# Patient Record
Sex: FEMALE | Race: WHITE | Employment: FULL TIME | URBAN - NONMETROPOLITAN AREA
[De-identification: names, ages, dates, MRNs, and addresses within clinical notes are randomized per-mention and may not be internally consistent; named-entity substitution may affect disease eponyms.]

---

## 2017-04-14 ENCOUNTER — HOSPITAL ENCOUNTER (OUTPATIENT)
Dept: GENERAL RADIOLOGY | Age: 37
Discharge: HOME | End: 2017-04-14
Payer: COMMERCIAL

## 2017-04-14 DIAGNOSIS — Z30.8 ENCOUNTER FOR POST ESSURE STERILIZATION CHECK: ICD-10-CM

## 2017-04-14 PROCEDURE — 74740 X-RAY FEMALE GENITAL TRACT: CPT

## 2017-09-13 ENCOUNTER — HOSPITAL ENCOUNTER (EMERGENCY)
Age: 37
Discharge: HOME | End: 2017-09-13
Payer: COMMERCIAL

## 2017-09-13 VITALS
OXYGEN SATURATION: 93 % | RESPIRATION RATE: 16 BRPM | HEART RATE: 65 BPM | SYSTOLIC BLOOD PRESSURE: 142 MMHG | TEMPERATURE: 98.3 F | DIASTOLIC BLOOD PRESSURE: 88 MMHG

## 2017-09-13 DIAGNOSIS — M79.602 PAIN OF LEFT UPPER EXTREMITY: Primary | ICD-10-CM

## 2017-09-13 DIAGNOSIS — M79.10 MYALGIA: ICD-10-CM

## 2017-09-13 PROCEDURE — 99282 EMERGENCY DEPT VISIT SF MDM: CPT

## 2017-09-13 RX ORDER — METHOCARBAMOL 500 MG/1
750 TABLET, FILM COATED ORAL 4 TIMES DAILY
Qty: 42 TABLET | Refills: 0 | Status: SHIPPED | OUTPATIENT
Start: 2017-09-13 | End: 2017-09-20

## 2017-09-13 RX ORDER — KETOROLAC TROMETHAMINE 30 MG/ML
30 INJECTION, SOLUTION INTRAMUSCULAR; INTRAVENOUS ONCE
Status: DISCONTINUED | OUTPATIENT
Start: 2017-09-13 | End: 2017-09-13 | Stop reason: HOSPADM

## 2017-09-13 ASSESSMENT — ENCOUNTER SYMPTOMS
SHORTNESS OF BREATH: 0
ABDOMINAL PAIN: 0
CONSTIPATION: 0
SORE THROAT: 0
EYE DISCHARGE: 0
EYE REDNESS: 0
BLOOD IN STOOL: 0
RHINORRHEA: 0
BACK PAIN: 0
COUGH: 0
VOMITING: 0
WHEEZING: 0
CHEST TIGHTNESS: 0
DIARRHEA: 0
NAUSEA: 0

## 2017-09-13 ASSESSMENT — PAIN SCALES - GENERAL: PAINLEVEL_OUTOF10: 8

## 2017-09-13 ASSESSMENT — PAIN DESCRIPTION - LOCATION: LOCATION: ARM

## 2017-09-13 ASSESSMENT — PAIN DESCRIPTION - DESCRIPTORS: DESCRIPTORS: TINGLING

## 2017-09-13 ASSESSMENT — PAIN DESCRIPTION - PAIN TYPE: TYPE: CHRONIC PAIN

## 2017-09-13 ASSESSMENT — PAIN DESCRIPTION - ORIENTATION: ORIENTATION: LEFT

## 2017-12-21 ENCOUNTER — HOSPITAL ENCOUNTER (EMERGENCY)
Age: 37
Discharge: HOME | End: 2017-12-21
Attending: EMERGENCY MEDICINE
Payer: COMMERCIAL

## 2017-12-21 ENCOUNTER — APPOINTMENT (OUTPATIENT)
Dept: GENERAL RADIOLOGY | Age: 37
End: 2017-12-21
Payer: COMMERCIAL

## 2017-12-21 VITALS
TEMPERATURE: 97.4 F | OXYGEN SATURATION: 96 % | RESPIRATION RATE: 16 BRPM | DIASTOLIC BLOOD PRESSURE: 59 MMHG | HEART RATE: 84 BPM | SYSTOLIC BLOOD PRESSURE: 119 MMHG

## 2017-12-21 DIAGNOSIS — J06.9 VIRAL UPPER RESPIRATORY TRACT INFECTION: Primary | ICD-10-CM

## 2017-12-21 DIAGNOSIS — J01.00 ACUTE MAXILLARY SINUSITIS, RECURRENCE NOT SPECIFIED: ICD-10-CM

## 2017-12-21 DIAGNOSIS — J11.1 INFLUENZA WITH RESPIRATORY MANIFESTATION OTHER THAN PNEUMONIA: ICD-10-CM

## 2017-12-21 LAB
DIRECT EXAM: ABNORMAL
DIRECT EXAM: NORMAL
Lab: ABNORMAL
Lab: NORMAL
Lab: NORMAL
SPECIMEN DESCRIPTION: ABNORMAL
SPECIMEN DESCRIPTION: NORMAL
STATUS: ABNORMAL
STATUS: NORMAL
STATUS: NORMAL

## 2017-12-21 PROCEDURE — 87651 STREP A DNA AMP PROBE: CPT

## 2017-12-21 PROCEDURE — 87804 INFLUENZA ASSAY W/OPTIC: CPT

## 2017-12-21 PROCEDURE — 71020 XR CHEST STANDARD TWO VW: CPT

## 2017-12-21 PROCEDURE — 99283 EMERGENCY DEPT VISIT LOW MDM: CPT

## 2017-12-21 RX ORDER — IBUPROFEN 800 MG/1
800 TABLET ORAL EVERY 6 HOURS PRN
COMMUNITY

## 2017-12-21 ASSESSMENT — PAIN SCALES - GENERAL: PAINLEVEL_OUTOF10: 8

## 2017-12-21 ASSESSMENT — ENCOUNTER SYMPTOMS
ABDOMINAL PAIN: 0
BLOOD IN STOOL: 0
TROUBLE SWALLOWING: 0
PHOTOPHOBIA: 0
NAUSEA: 0
FACIAL SWELLING: 0
SHORTNESS OF BREATH: 1
WHEEZING: 0
DIARRHEA: 0
COUGH: 1
SORE THROAT: 0
BACK PAIN: 0
VOICE CHANGE: 0
SINUS PRESSURE: 1
VOMITING: 0
CHEST TIGHTNESS: 0

## 2017-12-21 ASSESSMENT — PAIN DESCRIPTION - DESCRIPTORS: DESCRIPTORS: ACHING

## 2017-12-21 ASSESSMENT — PAIN DESCRIPTION - LOCATION: LOCATION: GENERALIZED

## 2017-12-21 NOTE — ED PROVIDER NOTES
not injected. No scleral icterus. Neck: Normal range of motion. Neck supple. No tracheal deviation present. No thyromegaly present. Cardiovascular: Normal rate, regular rhythm, normal heart sounds and intact distal pulses. Exam reveals no gallop and no friction rub. No murmur heard. Pulmonary/Chest: Effort normal and breath sounds normal. No stridor. No respiratory distress. She has no wheezes. She has no rales. Abdominal: Soft. Bowel sounds are normal. She exhibits no distension and no mass. There is no tenderness. There is no rebound and no guarding. Negative Bradley's sign  Nontender McBurney's Point  Negative Rovsig's sign  No bruising or echymosis of abdomen   Musculoskeletal: She exhibits no edema or tenderness. Negative Sanford's Sign bilaterally   Lymphadenopathy:     She has no cervical adenopathy. Neurological: She is alert and oriented to person, place, and time. No cranial nerve deficit. She exhibits normal muscle tone. Coordination normal.   No nystagmus   Skin: Skin is warm and dry. No rash noted. She is not diaphoretic. No erythema. No pallor. Psychiatric: She has a normal mood and affect. Her behavior is normal. Thought content normal.   Nursing note and vitals reviewed.       DIAGNOSTIC RESULTS     EKG: (none if blank)  All EKG's are interpreted by the Emergency Department Physician who either signs or Co-signs this chart in the absence of a cardiologist.        RADIOLOGY: (none if blank)   Interpretation per the Radiologist below, if available at the time of this note:    XR CHEST STANDARD (2 VW)    (Results Pending)       LABS:  Labs Reviewed   RAPID INFLUENZA A/B ANTIGENS - Abnormal; Notable for the following:        Result Value    Direct Exam POSITIVE for Influenza A Antigen (*)     All other components within normal limits   STREP SCREEN GROUP A THROAT   STREP A DNA PROBE, AMPLIFICATION       All other labs were within normal range or not returned as of this dictation. EMERGENCY DEPARTMENT COURSE and Medical Decision Making:     MDM/  ED Course      Well-appearing afebrile, nontoxic 47-year-old female with no evidence of bacterial illness at this time. Antibiotics are not indicated. I suggested to her use of a Tiny pot, Tylenol, Motrin, follow-up with primary care physician  Strict return precautions and follow up instructions were discussed with the patient with which the patient agrees    CONSULTS: (None if blank)  None    Procedures: (None if blank)       CLINICAL IMPRESSION      1. Viral upper respiratory tract infection    2. Acute maxillary sinusitis, recurrence not specified    3.  Influenza with respiratory manifestation other than pneumonia          DISPOSITION/PLAN   DISPOSITION        PATIENT REFERRED TO:  [place] [address]  [phone]  In 2 days        DISCHARGE MEDICATIONS:  New Prescriptions    No medications on file              (Please note that portions of this note were completed with a voice recognition program.  Efforts were made to edit the dictations but occasionally words are mis-transcribed.)      Berenice Damian DO, FACEP (electronically signed)  Attending Emergency Physician         Jacey Salcido DO  12/21/17 2828

## 2018-09-22 ENCOUNTER — HOSPITAL ENCOUNTER (EMERGENCY)
Age: 38
Discharge: HOME | End: 2018-09-22
Attending: EMERGENCY MEDICINE
Payer: COMMERCIAL

## 2018-09-22 VITALS
SYSTOLIC BLOOD PRESSURE: 130 MMHG | OXYGEN SATURATION: 98 % | BODY MASS INDEX: 23.92 KG/M2 | HEIGHT: 62 IN | RESPIRATION RATE: 18 BRPM | WEIGHT: 130 LBS | DIASTOLIC BLOOD PRESSURE: 70 MMHG | HEART RATE: 90 BPM | TEMPERATURE: 98.4 F

## 2018-09-22 DIAGNOSIS — J01.00 ACUTE MAXILLARY SINUSITIS, RECURRENCE NOT SPECIFIED: Primary | ICD-10-CM

## 2018-09-22 PROCEDURE — 99282 EMERGENCY DEPT VISIT SF MDM: CPT

## 2018-09-22 RX ORDER — AMOXICILLIN 500 MG/1
500 CAPSULE ORAL 2 TIMES DAILY
Qty: 20 CAPSULE | Refills: 0 | Status: SHIPPED | OUTPATIENT
Start: 2018-09-22 | End: 2018-10-02

## 2018-09-22 ASSESSMENT — ENCOUNTER SYMPTOMS
BLOOD IN STOOL: 0
BACK PAIN: 0
VOICE CHANGE: 0
RHINORRHEA: 1
DIARRHEA: 0
TROUBLE SWALLOWING: 0
VOMITING: 0
SHORTNESS OF BREATH: 0
WHEEZING: 0
NAUSEA: 0
COUGH: 0
PHOTOPHOBIA: 0
SINUS PRESSURE: 1
CHEST TIGHTNESS: 0
ABDOMINAL PAIN: 0
SORE THROAT: 0

## 2018-09-22 NOTE — ED PROVIDER NOTES
[facility] ED  eMERGENCY dEPARTMENT eNCOUnter      Pt Name: Concetta Scanlon  MRN: [id number]  [place] 1980  Date of evaluation: 9/22/2018  Provider: Nikunj Dill, [address] [place] [address]       Chief Complaint   Patient presents with    Nasal Congestion     onset this week and worsening       HISTORY OF PRESENT ILLNESS    Concetta Scanlon is a 42 y.o. female who presents to the emergency department from home With complaints of nasal congestion, facial pressure which worsens when she leans forward, as well as some rhinorrhea. Mild nonproductive cough. No fevers or chills. No severe headaches. No paresthesias or weakness. She has had some bilateral ear pain      Triage notes and Nursing notes were reviewed by myself. Any discrepancies are addressed above. PAST MEDICAL HISTORY     Past Medical History:   Diagnosis Date    Deaf        SURGICAL HISTORY       Past Surgical History:   Procedure Laterality Date    CHOLECYSTECTOMY  3-    TUBAL LIGATION         CURRENT MEDICATIONS       Previous Medications    ESTRADIOL CYPIONATE (DEPO-ESTRADIOL IM)    Inject 1 each into the muscle. Every 3 months.      IBUPROFEN (ADVIL;MOTRIN) 800 MG TABLET    Take 800 mg by mouth every 6 hours as needed for Pain    PSEUDOEPH-DOXYLAMINE-DM-APAP (NYQUIL PO)    Take by mouth       ALLERGIES     Pcn [penicillins]    FAMILY HISTORY       Family History   Problem Relation Age of Onset    Diabetes Maternal Grandfather     Heart Disease Maternal Grandfather         SOCIAL HISTORY       Social History     Social History    Marital status: Single     Spouse name: N/A    Number of children: N/A    Years of education: N/A     Social History Main Topics    Smoking status: Current Every Day Smoker     Packs/day: 0.50     Years: 15.00    Smokeless tobacco: Never Used    Alcohol use 3.6 oz/week     6 Cans of beer per week      Comment:  weekends occassionally    Drug use: No    Sexual activity: Not Asked Other Topics Concern    None     Social History Narrative    None       REVIEW OF SYSTEMS       Review of Systems   Constitutional: Negative for chills, diaphoresis and fever. HENT: Positive for postnasal drip, rhinorrhea and sinus pressure. Negative for sore throat, trouble swallowing and voice change. Eyes: Negative for photophobia and visual disturbance. Respiratory: Negative for cough, chest tightness, shortness of breath and wheezing. Cardiovascular: Negative for chest pain, palpitations and leg swelling. Gastrointestinal: Negative for abdominal pain, blood in stool, diarrhea, nausea and vomiting. Endocrine: Negative for polydipsia and polyuria. Genitourinary: Negative for dysuria, frequency, hematuria and urgency. Musculoskeletal: Negative for back pain, neck pain and neck stiffness. Skin: Negative for pallor and rash. Neurological: Negative for seizures, speech difficulty, weakness, numbness and headaches. Hematological: Does not bruise/bleed easily. Psychiatric/Behavioral: Negative for confusion. The patient is not nervous/anxious. Except as noted above the remainder of the review of systems was reviewed and is negative. PHYSICAL EXAM    (up to 7 for level 4, 8 or more for level 5)     ED Triage Vitals [09/22/18 0951]   BP Temp Temp Source Pulse Resp SpO2 Height Weight   (!) 134/100 98.4 °F (36.9 °C) Tympanic 101 18 98 % 5' 2\" (1.575 m) 130 lb (59 kg)       Physical Exam   Constitutional: She is oriented to person, place, and time. Vital signs are normal. She appears well-developed and well-nourished. Non-toxic appearance. She does not appear ill. No distress. HENT:   Head: Normocephalic and atraumatic. Mouth/Throat: Oropharynx is clear and moist.   Maxillary and frontal sinus tenderness   Eyes: Pupils are equal, round, and reactive to light. Conjunctivae and EOM are normal. Right conjunctiva is not injected. Left conjunctiva is not injected. No scleral icterus. Neck: Normal range of motion. Neck supple. No tracheal deviation present. No thyromegaly present. Cardiovascular: Normal rate, regular rhythm, normal heart sounds and intact distal pulses. Exam reveals no gallop and no friction rub. No murmur heard. Pulmonary/Chest: Effort normal and breath sounds normal. No stridor. No respiratory distress. She has no wheezes. She has no rales. Abdominal: Soft. Bowel sounds are normal. She exhibits no distension and no mass. There is no tenderness. There is no rebound and no guarding. Negative Bradley's sign  Nontender McBurney's Point  Negative Rovsig's sign  No bruising or echymosis of abdomen   Musculoskeletal: She exhibits no edema or tenderness. Negative Sanford's Sign bilaterally   Lymphadenopathy:     She has no cervical adenopathy. Neurological: She is alert and oriented to person, place, and time. No cranial nerve deficit. She exhibits normal muscle tone. Coordination normal.   No nystagmus   Skin: Skin is warm and dry. No rash noted. She is not diaphoretic. No erythema. No pallor. Psychiatric: She has a normal mood and affect. Her behavior is normal. Thought content normal.   Nursing note and vitals reviewed. DIAGNOSTIC RESULTS     EKG: (none if blank)  All EKG's are interpreted by the Emergency Department Physician who either signs or Co-signs this chart in the absence of a cardiologist.        RADIOLOGY: (none if blank)   Interpretation per the Radiologist below, if available at the time of this note:    No orders to display       LABS:  Labs Reviewed - No data to display    All other labs were within normal range or not returned as of this dictation. EMERGENCY DEPARTMENT COURSE and Medical Decision Making:     MDM/   Patient presents with maxillary sinusitis with no evidence of complications, no CNS extension.   Encouraged her to use Sudafed and a Lyons pot, and if no better, she started start taking an antibiotic but asked her to wait at least 7 days until she has been symptomatic for over 2 weeks as it is unlikely that the antibiotics will help her At this time. The patient agreed  Strict return precautions and follow up instructions were discussed with the patient with which the patient agrees    ED Medications administered this visit:  Medications - No data to display    CONSULTS: (None if blank)  None    Procedures: (None if blank)       CLINICAL IMPRESSION      1.  Acute maxillary sinusitis, recurrence not specified          DISPOSITION/PLAN   DISPOSITION Decision To Discharge 09/22/2018 10:13:46 AM      PATIENT REFERRED TO:  [address]  [phone]  In 3 days        DISCHARGE MEDICATIONS:  New Prescriptions    AMOXICILLIN (AMOXIL) 500 MG CAPSULE    Take 1 capsule by mouth 2 times daily for 10 days Fill if no better after 9/29/18              (Please note that portions of this note were completed with a voice recognition program.  Efforts were made to edit the dictations but occasionally words are mis-transcribed.)      Nikunj Dill DO (electronically signed)  Attending Emergency Department Provider          Debo Ruiz DO  09/22/18 [address] [place] [address]  09/22/18 [facility]

## 2018-11-16 ENCOUNTER — HOSPITAL ENCOUNTER (EMERGENCY)
Age: 38
Discharge: HOME | End: 2018-11-16
Attending: EMERGENCY MEDICINE
Payer: COMMERCIAL

## 2018-11-16 ENCOUNTER — APPOINTMENT (OUTPATIENT)
Dept: GENERAL RADIOLOGY | Age: 38
End: 2018-11-16
Payer: COMMERCIAL

## 2018-11-16 VITALS
HEART RATE: 79 BPM | WEIGHT: 130 LBS | TEMPERATURE: 97.9 F | HEIGHT: 62 IN | DIASTOLIC BLOOD PRESSURE: 82 MMHG | BODY MASS INDEX: 23.92 KG/M2 | RESPIRATION RATE: 16 BRPM | OXYGEN SATURATION: 97 % | SYSTOLIC BLOOD PRESSURE: 136 MMHG

## 2018-11-16 DIAGNOSIS — M25.511 RIGHT SHOULDER PAIN, UNSPECIFIED CHRONICITY: Primary | ICD-10-CM

## 2018-11-16 LAB
ABSOLUTE EOS #: 0.23 K/UL (ref 0–0.44)
ABSOLUTE IMMATURE GRANULOCYTE: 0.03 K/UL (ref 0–0.3)
ABSOLUTE LYMPH #: 3.01 K/UL (ref 1.1–3.7)
ABSOLUTE MONO #: 0.72 K/UL (ref 0.1–1.2)
ALBUMIN SERPL-MCNC: 4.2 G/DL (ref 3.5–5.2)
ALBUMIN/GLOBULIN RATIO: 1.5 (ref 1–2.5)
ALP BLD-CCNC: 71 U/L (ref 35–104)
ALT SERPL-CCNC: 12 U/L (ref 5–33)
AMYLASE: 52 U/L (ref 28–100)
ANION GAP SERPL CALCULATED.3IONS-SCNC: 14 MMOL/L (ref 9–17)
ANION GAP SERPL CALCULATED.3IONS-SCNC: 14 MMOL/L (ref 9–17)
AST SERPL-CCNC: 12 U/L
BASOPHILS # BLD: 1 % (ref 0–2)
BASOPHILS ABSOLUTE: 0.05 K/UL (ref 0–0.2)
BILIRUB SERPL-MCNC: 0.18 MG/DL (ref 0.3–1.2)
BNP INTERPRETATION: NORMAL
BUN BLDV-MCNC: 16 MG/DL (ref 6–20)
BUN BLDV-MCNC: 16 MG/DL (ref 6–20)
BUN/CREAT BLD: 24 (ref 9–20)
BUN/CREAT BLD: 24 (ref 9–20)
CALCIUM SERPL-MCNC: 8.8 MG/DL (ref 8.6–10.4)
CALCIUM SERPL-MCNC: 8.9 MG/DL (ref 8.6–10.4)
CHLORIDE BLD-SCNC: 100 MMOL/L (ref 98–107)
CHLORIDE BLD-SCNC: 102 MMOL/L (ref 98–107)
CO2: 21 MMOL/L (ref 20–31)
CO2: 21 MMOL/L (ref 20–31)
CREAT SERPL-MCNC: 0.66 MG/DL (ref 0.5–0.9)
CREAT SERPL-MCNC: 0.68 MG/DL (ref 0.5–0.9)
D-DIMER QUANTITATIVE: <0.19 MG/L FEU (ref 0.19–0.5)
DIFFERENTIAL TYPE: NORMAL
EKG ATRIAL RATE: 96 BPM
EKG P AXIS: 50 DEGREES
EKG P-R INTERVAL: 148 MS
EKG Q-T INTERVAL: 358 MS
EKG QRS DURATION: 62 MS
EKG QTC CALCULATION (BAZETT): 452 MS
EKG R AXIS: 10 DEGREES
EKG T AXIS: 39 DEGREES
EKG VENTRICULAR RATE: 96 BPM
EOSINOPHILS RELATIVE PERCENT: 2 % (ref 1–4)
GFR AFRICAN AMERICAN: >60 ML/MIN
GFR AFRICAN AMERICAN: >60 ML/MIN
GFR NON-AFRICAN AMERICAN: >60 ML/MIN
GFR NON-AFRICAN AMERICAN: >60 ML/MIN
GFR SERPL CREATININE-BSD FRML MDRD: ABNORMAL ML/MIN/{1.73_M2}
GLUCOSE BLD-MCNC: 92 MG/DL (ref 70–99)
GLUCOSE BLD-MCNC: 93 MG/DL (ref 70–99)
HCT VFR BLD CALC: 42.3 % (ref 36.3–47.1)
HEMOGLOBIN: 14.4 G/DL (ref 11.9–15.1)
IMMATURE GRANULOCYTES: 0 %
INR BLD: 0.9 (ref 0.9–1.2)
LIPASE: 23 U/L (ref 13–60)
LYMPHOCYTES # BLD: 31 % (ref 24–43)
MAGNESIUM: 2.1 MG/DL (ref 1.6–2.6)
MCH RBC QN AUTO: 31.9 PG (ref 25.2–33.5)
MCHC RBC AUTO-ENTMCNC: 34 G/DL (ref 28.4–34.8)
MCV RBC AUTO: 93.8 FL (ref 82.6–102.9)
MONOCYTES # BLD: 7 % (ref 3–12)
NRBC AUTOMATED: 0 PER 100 WBC
PARTIAL THROMBOPLASTIN TIME: 26.3 SEC (ref 23.2–34.4)
PDW BLD-RTO: 12.3 % (ref 11.8–14.4)
PLATELET # BLD: 246 K/UL (ref 138–453)
PLATELET ESTIMATE: NORMAL
PMV BLD AUTO: 9.4 FL (ref 8.1–13.5)
POTASSIUM SERPL-SCNC: 3.5 MMOL/L (ref 3.7–5.3)
POTASSIUM SERPL-SCNC: 3.6 MMOL/L (ref 3.7–5.3)
PRO-BNP: 66 PG/ML
PROTHROMBIN TIME: 9.5 SEC (ref 9.7–12.2)
RBC # BLD: 4.51 M/UL (ref 3.95–5.11)
RBC # BLD: NORMAL 10*6/UL
SEG NEUTROPHILS: 59 % (ref 36–65)
SEGMENTED NEUTROPHILS ABSOLUTE COUNT: 5.76 K/UL (ref 1.5–8.1)
SODIUM BLD-SCNC: 135 MMOL/L (ref 135–144)
SODIUM BLD-SCNC: 137 MMOL/L (ref 135–144)
TOTAL PROTEIN: 7 G/DL (ref 6.4–8.3)
TROPONIN INTERP: NORMAL
TROPONIN T: <0.03 NG/ML
WBC # BLD: 9.8 K/UL (ref 3.5–11.3)
WBC # BLD: NORMAL 10*3/UL

## 2018-11-16 PROCEDURE — 83735 ASSAY OF MAGNESIUM: CPT

## 2018-11-16 PROCEDURE — 85610 PROTHROMBIN TIME: CPT

## 2018-11-16 PROCEDURE — 84484 ASSAY OF TROPONIN QUANT: CPT

## 2018-11-16 PROCEDURE — 83880 ASSAY OF NATRIURETIC PEPTIDE: CPT

## 2018-11-16 PROCEDURE — 93005 ELECTROCARDIOGRAM TRACING: CPT

## 2018-11-16 PROCEDURE — 80048 BASIC METABOLIC PNL TOTAL CA: CPT

## 2018-11-16 PROCEDURE — 80053 COMPREHEN METABOLIC PANEL: CPT

## 2018-11-16 PROCEDURE — 85730 THROMBOPLASTIN TIME PARTIAL: CPT

## 2018-11-16 PROCEDURE — 99284 EMERGENCY DEPT VISIT MOD MDM: CPT

## 2018-11-16 PROCEDURE — 36415 COLL VENOUS BLD VENIPUNCTURE: CPT

## 2018-11-16 PROCEDURE — 96374 THER/PROPH/DIAG INJ IV PUSH: CPT

## 2018-11-16 PROCEDURE — 82150 ASSAY OF AMYLASE: CPT

## 2018-11-16 PROCEDURE — 6370000000 HC RX 637 (ALT 250 FOR IP): Performed by: EMERGENCY MEDICINE

## 2018-11-16 PROCEDURE — 6360000002 HC RX W HCPCS: Performed by: EMERGENCY MEDICINE

## 2018-11-16 PROCEDURE — 71045 X-RAY EXAM CHEST 1 VIEW: CPT

## 2018-11-16 PROCEDURE — 85025 COMPLETE CBC W/AUTO DIFF WBC: CPT

## 2018-11-16 PROCEDURE — 85379 FIBRIN DEGRADATION QUANT: CPT

## 2018-11-16 PROCEDURE — 83690 ASSAY OF LIPASE: CPT

## 2018-11-16 RX ORDER — OXYCODONE HYDROCHLORIDE AND ACETAMINOPHEN 5; 325 MG/1; MG/1
1 TABLET ORAL ONCE
Status: COMPLETED | OUTPATIENT
Start: 2018-11-16 | End: 2018-11-16

## 2018-11-16 RX ORDER — KETOROLAC TROMETHAMINE 15 MG/ML
15 INJECTION, SOLUTION INTRAMUSCULAR; INTRAVENOUS ONCE
Status: COMPLETED | OUTPATIENT
Start: 2018-11-16 | End: 2018-11-16

## 2018-11-16 RX ADMIN — OXYCODONE HYDROCHLORIDE AND ACETAMINOPHEN 1 TABLET: 5; 325 TABLET ORAL at 07:48

## 2018-11-16 RX ADMIN — KETOROLAC TROMETHAMINE 15 MG: 15 INJECTION, SOLUTION INTRAMUSCULAR; INTRAVENOUS at 06:50

## 2018-11-16 ASSESSMENT — PAIN DESCRIPTION - LOCATION: LOCATION: CHEST;SHOULDER;BACK

## 2018-11-16 ASSESSMENT — PAIN DESCRIPTION - PAIN TYPE
TYPE: ACUTE PAIN
TYPE: ACUTE PAIN

## 2018-11-16 ASSESSMENT — PAIN SCALES - GENERAL
PAINLEVEL_OUTOF10: 10

## 2018-11-16 NOTE — ED PROVIDER NOTES
QUANTITATIVE - Abnormal; Notable for the following:     D-Dimer, Quant <0.19 (*)     All other components within normal limits   APTT   BRAIN NATRIURETIC PEPTIDE   CBC WITH AUTO DIFFERENTIAL   TROPONIN   LIPASE   AMYLASE   MAGNESIUM             Summation      Patient Course: Patient is reevaluated and she appears comfortable and her pains improving. She is advised to follow up with PCP to get referred for pain management further management of her ongoing pain. ED Medications administered this visit:    Medications   oxyCODONE-acetaminophen (PERCOCET) 5-325 MG per tablet 1 tablet (not administered)   ketorolac (TORADOL) injection 15 mg (15 mg Intravenous Given 11/16/18 0650)       New Prescriptions from thisvisit:    Current Discharge Medication List          Follow-up:  [place] [address]  [phone]  In 2 days          DISPOSITION Decision To Discharge 11/16/2018 07:42:46 AM      Final Impression:   1.  Right shoulder pain, unspecified chronicity               (Please note that portions of this note were completed with a voice recognition program.  Efforts were made to edit the dictations but occasionally words aremis-transcribed.)          Celio Jon MD  11/16/18 1175

## 2020-02-20 ENCOUNTER — HOSPITAL ENCOUNTER (OUTPATIENT)
Age: 40
Setting detail: OBSERVATION
Discharge: HOME | End: 2020-02-21
Attending: EMERGENCY MEDICINE | Admitting: INTERNAL MEDICINE
Payer: COMMERCIAL

## 2020-02-20 LAB
ABSOLUTE EOS #: 0.13 K/UL (ref 0–0.44)
ABSOLUTE IMMATURE GRANULOCYTE: 0.07 K/UL (ref 0–0.3)
ABSOLUTE LYMPH #: 2.36 K/UL (ref 1.1–3.7)
ABSOLUTE MONO #: 1.12 K/UL (ref 0.1–1.2)
BASOPHILS # BLD: 0 % (ref 0–2)
BASOPHILS ABSOLUTE: 0.05 K/UL (ref 0–0.2)
DIFFERENTIAL TYPE: ABNORMAL
EOSINOPHILS RELATIVE PERCENT: 1 % (ref 1–4)
HCT VFR BLD CALC: 46.6 % (ref 36.3–47.1)
HEMOGLOBIN: 15.5 G/DL (ref 11.9–15.1)
IMMATURE GRANULOCYTES: 0 %
LYMPHOCYTES # BLD: 14 % (ref 24–43)
MCH RBC QN AUTO: 31 PG (ref 25.2–33.5)
MCHC RBC AUTO-ENTMCNC: 33.3 G/DL (ref 28.4–34.8)
MCV RBC AUTO: 93.2 FL (ref 82.6–102.9)
MONOCYTES # BLD: 6 % (ref 3–12)
NRBC AUTOMATED: 0 PER 100 WBC
PDW BLD-RTO: 12.3 % (ref 11.8–14.4)
PLATELET # BLD: 224 K/UL (ref 138–453)
PLATELET ESTIMATE: ABNORMAL
PMV BLD AUTO: 10.1 FL (ref 8.1–13.5)
RBC # BLD: 5 M/UL (ref 3.95–5.11)
RBC # BLD: ABNORMAL 10*6/UL
SEG NEUTROPHILS: 79 % (ref 36–65)
SEGMENTED NEUTROPHILS ABSOLUTE COUNT: 13.76 K/UL (ref 1.5–8.1)
WBC # BLD: 17.5 K/UL (ref 3.5–11.3)
WBC # BLD: ABNORMAL 10*3/UL

## 2020-02-20 PROCEDURE — 84703 CHORIONIC GONADOTROPIN ASSAY: CPT

## 2020-02-20 PROCEDURE — 96374 THER/PROPH/DIAG INJ IV PUSH: CPT

## 2020-02-20 PROCEDURE — 36415 COLL VENOUS BLD VENIPUNCTURE: CPT

## 2020-02-20 PROCEDURE — 2580000003 HC RX 258: Performed by: EMERGENCY MEDICINE

## 2020-02-20 PROCEDURE — 85730 THROMBOPLASTIN TIME PARTIAL: CPT

## 2020-02-20 PROCEDURE — 83690 ASSAY OF LIPASE: CPT

## 2020-02-20 PROCEDURE — 80053 COMPREHEN METABOLIC PANEL: CPT

## 2020-02-20 PROCEDURE — 87086 URINE CULTURE/COLONY COUNT: CPT

## 2020-02-20 PROCEDURE — 85610 PROTHROMBIN TIME: CPT

## 2020-02-20 PROCEDURE — 96375 TX/PRO/DX INJ NEW DRUG ADDON: CPT

## 2020-02-20 PROCEDURE — 6360000002 HC RX W HCPCS: Performed by: EMERGENCY MEDICINE

## 2020-02-20 PROCEDURE — 81001 URINALYSIS AUTO W/SCOPE: CPT

## 2020-02-20 PROCEDURE — 99285 EMERGENCY DEPT VISIT HI MDM: CPT

## 2020-02-20 PROCEDURE — 85025 COMPLETE CBC W/AUTO DIFF WBC: CPT

## 2020-02-20 RX ORDER — KETOROLAC TROMETHAMINE 15 MG/ML
15 INJECTION, SOLUTION INTRAMUSCULAR; INTRAVENOUS ONCE
Status: COMPLETED | OUTPATIENT
Start: 2020-02-20 | End: 2020-02-20

## 2020-02-20 RX ORDER — ONDANSETRON 2 MG/ML
4 INJECTION INTRAMUSCULAR; INTRAVENOUS ONCE
Status: COMPLETED | OUTPATIENT
Start: 2020-02-20 | End: 2020-02-20

## 2020-02-20 RX ORDER — 0.9 % SODIUM CHLORIDE 0.9 %
1000 INTRAVENOUS SOLUTION INTRAVENOUS ONCE
Status: COMPLETED | OUTPATIENT
Start: 2020-02-20 | End: 2020-02-21

## 2020-02-20 RX ADMIN — KETOROLAC TROMETHAMINE 15 MG: 15 INJECTION, SOLUTION INTRAMUSCULAR; INTRAVENOUS at 23:59

## 2020-02-20 RX ADMIN — ONDANSETRON 4 MG: 2 INJECTION INTRAMUSCULAR; INTRAVENOUS at 23:58

## 2020-02-20 RX ADMIN — SODIUM CHLORIDE 1000 ML: 9 INJECTION, SOLUTION INTRAVENOUS at 23:58

## 2020-02-20 ASSESSMENT — PAIN SCALES - GENERAL
PAINLEVEL_OUTOF10: 9
PAINLEVEL_OUTOF10: 10

## 2020-02-20 ASSESSMENT — PAIN DESCRIPTION - LOCATION: LOCATION: ABDOMEN

## 2020-02-20 ASSESSMENT — PAIN DESCRIPTION - ORIENTATION: ORIENTATION: RIGHT

## 2020-02-20 ASSESSMENT — PAIN DESCRIPTION - DESCRIPTORS: DESCRIPTORS: CRAMPING;SHARP

## 2020-02-20 ASSESSMENT — PAIN DESCRIPTION - PAIN TYPE: TYPE: ACUTE PAIN

## 2020-02-21 ENCOUNTER — APPOINTMENT (OUTPATIENT)
Dept: CT IMAGING | Age: 40
End: 2020-02-21
Payer: COMMERCIAL

## 2020-02-21 ENCOUNTER — ANESTHESIA EVENT (OUTPATIENT)
Dept: OPERATING ROOM | Age: 40
End: 2020-02-21
Payer: COMMERCIAL

## 2020-02-21 ENCOUNTER — ANESTHESIA (OUTPATIENT)
Dept: OPERATING ROOM | Age: 40
End: 2020-02-21
Payer: COMMERCIAL

## 2020-02-21 ENCOUNTER — APPOINTMENT (OUTPATIENT)
Dept: GENERAL RADIOLOGY | Age: 40
End: 2020-02-21
Payer: COMMERCIAL

## 2020-02-21 VITALS
DIASTOLIC BLOOD PRESSURE: 76 MMHG | SYSTOLIC BLOOD PRESSURE: 156 MMHG | OXYGEN SATURATION: 96 % | RESPIRATION RATE: 21 BRPM

## 2020-02-21 VITALS
OXYGEN SATURATION: 97 % | SYSTOLIC BLOOD PRESSURE: 129 MMHG | WEIGHT: 132 LBS | TEMPERATURE: 97.6 F | RESPIRATION RATE: 18 BRPM | HEIGHT: 62 IN | HEART RATE: 90 BPM | BODY MASS INDEX: 24.29 KG/M2 | DIASTOLIC BLOOD PRESSURE: 72 MMHG

## 2020-02-21 PROBLEM — N20.0 RENAL CALCULUS, RIGHT: Status: ACTIVE | Noted: 2020-02-21

## 2020-02-21 PROBLEM — N39.0 UTI (URINARY TRACT INFECTION): Status: ACTIVE | Noted: 2020-02-21

## 2020-02-21 LAB
-: ABNORMAL
ALBUMIN SERPL-MCNC: 4.1 G/DL (ref 3.5–5.2)
ALBUMIN/GLOBULIN RATIO: 1.3 (ref 1–2.5)
ALP BLD-CCNC: 77 U/L (ref 35–104)
ALT SERPL-CCNC: 11 U/L (ref 5–33)
AMORPHOUS: ABNORMAL
ANION GAP SERPL CALCULATED.3IONS-SCNC: 15 MMOL/L (ref 9–17)
AST SERPL-CCNC: 16 U/L
BACTERIA: ABNORMAL
BILIRUB SERPL-MCNC: 0.28 MG/DL (ref 0.3–1.2)
BILIRUBIN URINE: NEGATIVE
BUN BLDV-MCNC: 13 MG/DL (ref 6–20)
BUN/CREAT BLD: 11 (ref 9–20)
CALCIUM SERPL-MCNC: 9.2 MG/DL (ref 8.6–10.4)
CASTS UA: ABNORMAL /LPF
CHLORIDE BLD-SCNC: 99 MMOL/L (ref 98–107)
CO2: 23 MMOL/L (ref 20–31)
COLOR: YELLOW
COMMENT UA: ABNORMAL
CREAT SERPL-MCNC: 1.14 MG/DL (ref 0.5–0.9)
CRYSTALS, UA: ABNORMAL /HPF
EPITHELIAL CELLS UA: ABNORMAL /HPF (ref 0–25)
GFR AFRICAN AMERICAN: >60 ML/MIN
GFR NON-AFRICAN AMERICAN: 53 ML/MIN
GFR SERPL CREATININE-BSD FRML MDRD: ABNORMAL ML/MIN/{1.73_M2}
GFR SERPL CREATININE-BSD FRML MDRD: ABNORMAL ML/MIN/{1.73_M2}
GLUCOSE BLD-MCNC: 105 MG/DL (ref 70–99)
GLUCOSE URINE: NEGATIVE
HCG QUALITATIVE: NEGATIVE
INR BLD: 0.9 (ref 0.9–1.2)
KETONES, URINE: NEGATIVE
LACTIC ACID, SEPSIS WHOLE BLOOD: NORMAL MMOL/L (ref 0.5–1.9)
LACTIC ACID, SEPSIS WHOLE BLOOD: NORMAL MMOL/L (ref 0.5–1.9)
LACTIC ACID, SEPSIS: 1 MMOL/L (ref 0.5–1.9)
LACTIC ACID, SEPSIS: 1.3 MMOL/L (ref 0.5–1.9)
LEUKOCYTE ESTERASE, URINE: ABNORMAL
LIPASE: 22 U/L (ref 13–60)
MUCUS: ABNORMAL
NITRITE, URINE: NEGATIVE
OTHER OBSERVATIONS UA: ABNORMAL
PARTIAL THROMBOPLASTIN TIME: 27.1 SEC (ref 23.2–34.4)
PH UA: 6 (ref 5–9)
POTASSIUM SERPL-SCNC: 3.8 MMOL/L (ref 3.7–5.3)
PROTEIN UA: ABNORMAL
PROTHROMBIN TIME: 9.6 SEC (ref 9.7–12.2)
RBC UA: ABNORMAL /HPF (ref 0–2)
RENAL EPITHELIAL, UA: ABNORMAL /HPF
SODIUM BLD-SCNC: 137 MMOL/L (ref 135–144)
SPECIFIC GRAVITY UA: 1.03 (ref 1.01–1.02)
TOTAL PROTEIN: 7.3 G/DL (ref 6.4–8.3)
TRICHOMONAS: ABNORMAL
TURBIDITY: ABNORMAL
URINE HGB: ABNORMAL
UROBILINOGEN, URINE: NORMAL
WBC UA: ABNORMAL /HPF (ref 0–5)
YEAST: ABNORMAL

## 2020-02-21 PROCEDURE — 2580000003 HC RX 258: Performed by: UROLOGY

## 2020-02-21 PROCEDURE — G0378 HOSPITAL OBSERVATION PER HR: HCPCS

## 2020-02-21 PROCEDURE — 3600000012 HC SURGERY LEVEL 2 ADDTL 15MIN: Performed by: UROLOGY

## 2020-02-21 PROCEDURE — 3700000000 HC ANESTHESIA ATTENDED CARE: Performed by: UROLOGY

## 2020-02-21 PROCEDURE — 7100000001 HC PACU RECOVERY - ADDTL 15 MIN: Performed by: UROLOGY

## 2020-02-21 PROCEDURE — 6370000000 HC RX 637 (ALT 250 FOR IP): Performed by: UROLOGY

## 2020-02-21 PROCEDURE — 6360000002 HC RX W HCPCS: Performed by: UROLOGY

## 2020-02-21 PROCEDURE — 2709999900 HC NON-CHARGEABLE SUPPLY: Performed by: UROLOGY

## 2020-02-21 PROCEDURE — 87040 BLOOD CULTURE FOR BACTERIA: CPT

## 2020-02-21 PROCEDURE — 3700000001 HC ADD 15 MINUTES (ANESTHESIA): Performed by: UROLOGY

## 2020-02-21 PROCEDURE — 2580000003 HC RX 258: Performed by: INTERNAL MEDICINE

## 2020-02-21 PROCEDURE — 2500000003 HC RX 250 WO HCPCS: Performed by: NURSE ANESTHETIST, CERTIFIED REGISTERED

## 2020-02-21 PROCEDURE — 2580000003 HC RX 258: Performed by: EMERGENCY MEDICINE

## 2020-02-21 PROCEDURE — 74176 CT ABD & PELVIS W/O CONTRAST: CPT

## 2020-02-21 PROCEDURE — 6370000000 HC RX 637 (ALT 250 FOR IP): Performed by: EMERGENCY MEDICINE

## 2020-02-21 PROCEDURE — 83605 ASSAY OF LACTIC ACID: CPT

## 2020-02-21 PROCEDURE — 36415 COLL VENOUS BLD VENIPUNCTURE: CPT

## 2020-02-21 PROCEDURE — 6360000002 HC RX W HCPCS: Performed by: NURSE ANESTHETIST, CERTIFIED REGISTERED

## 2020-02-21 PROCEDURE — 74018 RADEX ABDOMEN 1 VIEW: CPT

## 2020-02-21 PROCEDURE — C1769 GUIDE WIRE: HCPCS | Performed by: UROLOGY

## 2020-02-21 PROCEDURE — 7100000000 HC PACU RECOVERY - FIRST 15 MIN: Performed by: UROLOGY

## 2020-02-21 PROCEDURE — 96376 TX/PRO/DX INJ SAME DRUG ADON: CPT

## 2020-02-21 PROCEDURE — 3600000002 HC SURGERY LEVEL 2 BASE: Performed by: UROLOGY

## 2020-02-21 PROCEDURE — 6360000002 HC RX W HCPCS: Performed by: EMERGENCY MEDICINE

## 2020-02-21 PROCEDURE — 96375 TX/PRO/DX INJ NEW DRUG ADDON: CPT

## 2020-02-21 PROCEDURE — C2617 STENT, NON-COR, TEM W/O DEL: HCPCS | Performed by: UROLOGY

## 2020-02-21 DEVICE — URETERAL STENT
Type: IMPLANTABLE DEVICE | Status: FUNCTIONAL
Brand: PERCUFLEX™ PLUS

## 2020-02-21 RX ORDER — AZITHROMYCIN 250 MG/1
1000 TABLET, FILM COATED ORAL ONCE
Status: COMPLETED | OUTPATIENT
Start: 2020-02-21 | End: 2020-02-21

## 2020-02-21 RX ORDER — METRONIDAZOLE 250 MG/1
2000 TABLET ORAL ONCE
Status: COMPLETED | OUTPATIENT
Start: 2020-02-21 | End: 2020-02-21

## 2020-02-21 RX ORDER — MIDAZOLAM HYDROCHLORIDE 1 MG/ML
INJECTION INTRAMUSCULAR; INTRAVENOUS PRN
Status: DISCONTINUED | OUTPATIENT
Start: 2020-02-21 | End: 2020-02-21 | Stop reason: SDUPTHER

## 2020-02-21 RX ORDER — LIDOCAINE HYDROCHLORIDE 20 MG/ML
INJECTION, SOLUTION EPIDURAL; INFILTRATION; INTRACAUDAL; PERINEURAL PRN
Status: DISCONTINUED | OUTPATIENT
Start: 2020-02-21 | End: 2020-02-21 | Stop reason: SDUPTHER

## 2020-02-21 RX ORDER — KETOROLAC TROMETHAMINE 15 MG/ML
10 INJECTION, SOLUTION INTRAMUSCULAR; INTRAVENOUS 3 TIMES DAILY PRN
Status: DISCONTINUED | OUTPATIENT
Start: 2020-02-21 | End: 2020-02-21

## 2020-02-21 RX ORDER — ACETAMINOPHEN 10 MG/ML
INJECTION, SOLUTION INTRAVENOUS PRN
Status: DISCONTINUED | OUTPATIENT
Start: 2020-02-21 | End: 2020-02-21 | Stop reason: SDUPTHER

## 2020-02-21 RX ORDER — SODIUM CHLORIDE 9 MG/ML
INJECTION, SOLUTION INTRAVENOUS CONTINUOUS
Status: DISCONTINUED | OUTPATIENT
Start: 2020-02-21 | End: 2020-02-21

## 2020-02-21 RX ORDER — ONDANSETRON 2 MG/ML
INJECTION INTRAMUSCULAR; INTRAVENOUS PRN
Status: DISCONTINUED | OUTPATIENT
Start: 2020-02-21 | End: 2020-02-21 | Stop reason: SDUPTHER

## 2020-02-21 RX ORDER — ONDANSETRON 2 MG/ML
4 INJECTION INTRAMUSCULAR; INTRAVENOUS ONCE
Status: COMPLETED | OUTPATIENT
Start: 2020-02-21 | End: 2020-02-21

## 2020-02-21 RX ORDER — SODIUM CHLORIDE 9 MG/ML
INJECTION, SOLUTION INTRAVENOUS CONTINUOUS
Status: DISCONTINUED | OUTPATIENT
Start: 2020-02-21 | End: 2020-02-21 | Stop reason: HOSPADM

## 2020-02-21 RX ORDER — FENTANYL CITRATE 50 UG/ML
50 INJECTION, SOLUTION INTRAMUSCULAR; INTRAVENOUS ONCE
Status: COMPLETED | OUTPATIENT
Start: 2020-02-21 | End: 2020-02-21

## 2020-02-21 RX ORDER — DEXAMETHASONE SODIUM PHOSPHATE 4 MG/ML
INJECTION, SOLUTION INTRA-ARTICULAR; INTRALESIONAL; INTRAMUSCULAR; INTRAVENOUS; SOFT TISSUE PRN
Status: DISCONTINUED | OUTPATIENT
Start: 2020-02-21 | End: 2020-02-21 | Stop reason: SDUPTHER

## 2020-02-21 RX ORDER — CEFAZOLIN SODIUM 2 G/50ML
2 SOLUTION INTRAVENOUS ONCE
Status: DISCONTINUED | OUTPATIENT
Start: 2020-02-21 | End: 2020-02-21

## 2020-02-21 RX ORDER — 0.9 % SODIUM CHLORIDE 0.9 %
1000 INTRAVENOUS SOLUTION INTRAVENOUS ONCE
Status: COMPLETED | OUTPATIENT
Start: 2020-02-21 | End: 2020-02-21

## 2020-02-21 RX ORDER — FENTANYL CITRATE 50 UG/ML
50 INJECTION, SOLUTION INTRAMUSCULAR; INTRAVENOUS EVERY 5 MIN PRN
Status: DISCONTINUED | OUTPATIENT
Start: 2020-02-21 | End: 2020-02-21 | Stop reason: HOSPADM

## 2020-02-21 RX ORDER — SULFAMETHOXAZOLE AND TRIMETHOPRIM 800; 160 MG/1; MG/1
1 TABLET ORAL 2 TIMES DAILY
Qty: 20 TABLET | Refills: 0 | Status: SHIPPED | OUTPATIENT
Start: 2020-02-21 | End: 2020-03-02

## 2020-02-21 RX ORDER — KETOROLAC TROMETHAMINE 15 MG/ML
10 INJECTION, SOLUTION INTRAMUSCULAR; INTRAVENOUS 3 TIMES DAILY PRN
Status: DISCONTINUED | OUTPATIENT
Start: 2020-02-21 | End: 2020-02-21 | Stop reason: HOSPADM

## 2020-02-21 RX ORDER — ONDANSETRON 2 MG/ML
4 INJECTION INTRAMUSCULAR; INTRAVENOUS
Status: DISCONTINUED | OUTPATIENT
Start: 2020-02-21 | End: 2020-02-21 | Stop reason: HOSPADM

## 2020-02-21 RX ORDER — PROPOFOL 10 MG/ML
INJECTION, EMULSION INTRAVENOUS PRN
Status: DISCONTINUED | OUTPATIENT
Start: 2020-02-21 | End: 2020-02-21 | Stop reason: SDUPTHER

## 2020-02-21 RX ORDER — LIDOCAINE HYDROCHLORIDE 20 MG/ML
JELLY TOPICAL PRN
Status: DISCONTINUED | OUTPATIENT
Start: 2020-02-21 | End: 2020-02-21 | Stop reason: HOSPADM

## 2020-02-21 RX ORDER — FLUCONAZOLE 150 MG/1
150 TABLET ORAL ONCE
Status: COMPLETED | OUTPATIENT
Start: 2020-02-21 | End: 2020-02-21

## 2020-02-21 RX ADMIN — FLUCONAZOLE 150 MG: 150 TABLET ORAL at 02:26

## 2020-02-21 RX ADMIN — DEXTROSE MONOHYDRATE 2 G: 50 INJECTION, SOLUTION INTRAVENOUS at 07:37

## 2020-02-21 RX ADMIN — ONDANSETRON 4 MG: 2 INJECTION INTRAMUSCULAR; INTRAVENOUS at 00:50

## 2020-02-21 RX ADMIN — SODIUM CHLORIDE: 9 INJECTION, SOLUTION INTRAVENOUS at 04:42

## 2020-02-21 RX ADMIN — LIDOCAINE HYDROCHLORIDE 60 MG: 20 INJECTION, SOLUTION EPIDURAL; INFILTRATION; INTRACAUDAL at 07:45

## 2020-02-21 RX ADMIN — SODIUM CHLORIDE 1000 ML: 9 INJECTION, SOLUTION INTRAVENOUS at 02:23

## 2020-02-21 RX ADMIN — PROPOFOL 140 MG: 10 INJECTION, EMULSION INTRAVENOUS at 07:45

## 2020-02-21 RX ADMIN — DEXAMETHASONE SODIUM PHOSPHATE 4 MG: 4 INJECTION, SOLUTION INTRAMUSCULAR; INTRAVENOUS at 07:51

## 2020-02-21 RX ADMIN — ACETAMINOPHEN 1000 MG: 10 INJECTION, SOLUTION INTRAVENOUS at 07:50

## 2020-02-21 RX ADMIN — ONDANSETRON 4 MG: 2 INJECTION INTRAMUSCULAR; INTRAVENOUS at 07:51

## 2020-02-21 RX ADMIN — AZITHROMYCIN MONOHYDRATE 1000 MG: 250 TABLET ORAL at 02:23

## 2020-02-21 RX ADMIN — CEFTRIAXONE 1 G: 1 INJECTION, POWDER, FOR SOLUTION INTRAMUSCULAR; INTRAVENOUS at 02:24

## 2020-02-21 RX ADMIN — MIDAZOLAM 2 MG: 1 INJECTION INTRAMUSCULAR; INTRAVENOUS at 07:43

## 2020-02-21 RX ADMIN — SODIUM CHLORIDE: 9 INJECTION, SOLUTION INTRAVENOUS at 08:50

## 2020-02-21 RX ADMIN — FENTANYL CITRATE 50 MCG: 50 INJECTION INTRAMUSCULAR; INTRAVENOUS at 00:51

## 2020-02-21 RX ADMIN — METRONIDAZOLE 2000 MG: 250 TABLET ORAL at 02:23

## 2020-02-21 ASSESSMENT — ENCOUNTER SYMPTOMS
BACK PAIN: 0
BLOOD IN STOOL: 0
COLOR CHANGE: 0
SHORTNESS OF BREATH: 0
RHINORRHEA: 0
NAUSEA: 1
DIARRHEA: 0
VOMITING: 1
ABDOMINAL PAIN: 1
WHEEZING: 0

## 2020-02-21 ASSESSMENT — PULMONARY FUNCTION TESTS
PIF_VALUE: 0
PIF_VALUE: 3
PIF_VALUE: 4
PIF_VALUE: 1
PIF_VALUE: 0
PIF_VALUE: 3
PIF_VALUE: 4
PIF_VALUE: 2
PIF_VALUE: 3
PIF_VALUE: 2
PIF_VALUE: 4
PIF_VALUE: 1
PIF_VALUE: 1
PIF_VALUE: 0
PIF_VALUE: 3
PIF_VALUE: 18
PIF_VALUE: 3
PIF_VALUE: 3

## 2020-02-21 ASSESSMENT — PAIN - FUNCTIONAL ASSESSMENT: PAIN_FUNCTIONAL_ASSESSMENT: 0-10

## 2020-02-21 ASSESSMENT — PAIN SCALES - GENERAL
PAINLEVEL_OUTOF10: 0

## 2020-02-21 ASSESSMENT — LIFESTYLE VARIABLES: SMOKING_STATUS: 1

## 2020-02-21 NOTE — ANESTHESIA POSTPROCEDURE EVALUATION
Department of Anesthesiology  Postprocedure Note    Patient: Stonedelfino Clamp  MRN: [id number]  YOB: 1980  Date of evaluation: 2/21/2020  Time:  9:18 AM     Procedure Summary     Date:  02/21/20 Room / Location:  [facility]    Anesthesia Start:  6318 Anesthesia Stop:  0804    Procedure:  CYSTOSCOPY  STENT INSERTION (Right ) Diagnosis:  (PYELONEPHRITIS)    Surgeon:  Julian Arechiga MD Responsible Provider:  PEPE Salazar CRNA    Anesthesia Type:  general ASA Status:  2          Anesthesia Type: general    Peri Phase I: Peri Score: 10    Peri Phase II:      Last vitals: Reviewed and per EMR flowsheets.        Anesthesia Post Evaluation    Patient location during evaluation: PACU  Patient participation: complete - patient participated  Level of consciousness: awake and alert  Airway patency: patent  Nausea & Vomiting: no nausea and no vomiting  Complications: no  Cardiovascular status: blood pressure returned to baseline and hemodynamically stable  Respiratory status: acceptable and room air  Hydration status: euvolemic

## 2020-02-21 NOTE — H&P
History and Physical    Patient:  Sarah Mohamud  MRN: [id number]    Chief Complaint:  Right flank pain    History Obtained From:  patient, electronic medical record    PCP: PEPE Rogers CNP    History of Present Illness: The patient is a 37 y.o. female who presents with Right Flank Pain. Patient did present to the emergency department last night. Patient had right flank pain. This is been ongoing. Patient has seen a chiropractor for this pain. Patient did have a CT scan performed. This film was independently reviewed. This does show a 3 mm stone in the right distal ureter. Patient also had elevated white blood cell count of 17,500. Patient's creatinine was slightly elevated but overall normal.  Patient has had no fevers. She reports no history of stones. Pain is controlled with IV pain medication. Patient currently is resting in bed. She just returned from a Cystoscopy with right renal stent placement. She denies pain at this time. Denies n/v. Tolerating diet and activity well. Denies hematuria but does have frequency. I explained with the stent, that will be a normal feeling. Past Medical History:        Diagnosis Date    Deaf        Past Surgical History:        Procedure Laterality Date    CHOLECYSTECTOMY  3-    CYSTOSCOPY  02/21/2020    with stent placement    TUBAL LIGATION         Family History:       Problem Relation Age of Onset    Diabetes Maternal Grandfather     Heart Disease Maternal Grandfather        Social History:   TOBACCO:   reports that she has been smoking. She has a 7.50 pack-year smoking history. She has never used smokeless tobacco.  ETOH:   reports current alcohol use of about 6.0 standard drinks of alcohol per week.   ELICIT DRUG USE:    Social History     Substance and Sexual Activity   Drug Use No     OCCUPATION: Unknown      Allergies:  Pcn [penicillins]    Medications Prior to Admission:    Prior to Admission medications    Medication Sig Start Date End Date Taking? Authorizing Provider   ibuprofen (ADVIL;MOTRIN) 800 MG tablet Take 800 mg by mouth every 6 hours as needed for Pain   Yes Historical Provider, MD   Pseudoeph-Doxylamine-DM-APAP (NYQUIL PO) Take by mouth    Historical Provider, MD   Estradiol Cypionate (DEPO-ESTRADIOL IM) Inject 1 each into the muscle. Every 3 months. Historical Provider, MD       Review of Systems:  Constitutional:negative  for fevers, and negative for chills. Eyes: negative for visual disturbance   ENT: negative for sore throat, negative nasal congestion, and negative for earache  Respiratory: negative for shortness of breath, negative for cough, and negative for wheezing  Cardiovascular: negative for chest pain, negative for palpitations, and negative for syncope  Gastrointestinal: positive for abdominal pain, positive for nausea,negative for vomiting, negative for diarrhea, negative for constipation, and negative for hematochezia or melena  Genitourinary: negative for dysuria, negative for urinary urgency, positive for urinary frequency, and negative for hematuria  Skin: negative for skin rash, and negative for skin lesions  Neurological: negative for unilateral weakness, numbness or tingling. Physical Exam:    Vitals:   Temp: 97.6 °F (36.4 °C)  BP: 137/87  Resp: 20  Pulse: 86  SpO2: 97 %  24HR INTAKE/OUTPUT:      Intake/Output Summary (Last 24 hours) at 2/21/2020 1108  Last data filed at 2/21/2020 0827  Gross per 24 hour   Intake 2200 ml   Output --   Net 2200 ml       Exam:  GEN:  alert and oriented to person, place and time, well-developed and well-nourished, in no acute distress  EYES: No gross abnormalities. , PERRL and EOMI  NECK: normal, supple, no lymphadenopathy,  no carotid bruits  PULM: clear to auscultation bilaterally- no wheezes, rales or rhonchi, normal air movement, no respiratory distress  COR: regular rate & rhythm, no murmurs, no gallops, S1 normal and S2 normal  ABD:  soft, non-tender, infection) 02/21/2020    Hearing impaired person 03/26/2012       Plan:     · This patient requires overnight observation because of Right Flank Pain, UTI,   · Factors affecting the medical complexity of this patient include Renal Calculi  · Estimated length of stay is 1 days  · Appreciate Urology Consult--UTI/Right Renal Calculi  · IV Fluids--UTI/Right Renal Calculi  · IV ATBs--UTI  · Pain control--Right Renal Calculi  · High risk medication monitoring: None    CORE MEASURES  DVT prophylaxis: Lovenox  Decubitus ulcer present on admission: No  CODE STATUS: FULL CODE  Nutrition Status: good   Physical therapy: NA   Old Charts reviewed: Yes  EKG Reviewed: No  Advance Directive Addressed: No    PEPE Keenan, NP-C  2/21/2020, 11:08 AM

## 2020-02-21 NOTE — CONSULTS
Urology Consultation    Patient:  Nancy Engle  MRN: [id number]  YOB: 1980    CHIEF COMPLAINT:  Right Flank Pain    HISTORY OF PRESENT ILLNESS:   The patient is a 37 y.o. female who presents with Right Flank Pain. Patient did present to the emergency department last night. Patient had right flank pain. This is been ongoing. Patient has seen a chiropractor for this pain. Patient did have a CT scan performed. This film was independently reviewed. This does show a 3 mm stone in the right distal ureter. Patient also had elevated white blood cell count of 17,500. Patient's creatinine was slightly elevated but overall normal.  Patient has had no fevers. She reports no history of stones. Pain is controlled with IV pain medication. Patient's old records, notes and chart reviewed and summarized above. Past Medical History:    Past Medical History:   Diagnosis Date    Deaf        Past Surgical History:    Past Surgical History:   Procedure Laterality Date    CHOLECYSTECTOMY  3-    TUBAL LIGATION       Previous  surgery: none   Medications:    Scheduled Meds:   ceFAZolin  2 g Intravenous Once     Continuous Infusions:   [MAR Hold] sodium chloride 125 mL/hr at 02/21/20 0442     PRN Meds:. [MAR Hold] ketorolac    Allergies:  Pcn [penicillins]    Social History:    Social History     Socioeconomic History    Marital status: Single     Spouse name: Not on file    Number of children: Not on file    Years of education: Not on file    Highest education level: Not on file   Occupational History    Not on file   Social Needs    Financial resource strain: Not on file    Food insecurity:     Worry: Not on file     Inability: Not on file    Transportation needs:     Medical: Not on file     Non-medical: Not on file   Tobacco Use    Smoking status: Current Every Day Smoker     Packs/day: 0.50     Years: 15.00     Pack years: 7.50    Smokeless tobacco: Never Used   Substance and

## 2020-02-21 NOTE — ED PROVIDER NOTES
None   Lactate, Sepsis   Result Value Ref Range    Lactic Acid, Sepsis 1.0 0.5 - 1.9 mmol/L    Lactic Acid, Sepsis, Whole Blood NOT REPORTED 0.5 - 1.9 mmol/L   Protime-INR   Result Value Ref Range    Protime 9.6 (L) 9.7 - 12.2 sec    INR 0.9 0.9 - 1.2   APTT   Result Value Ref Range    PTT 27.1 23.2 - 34.4 sec       IMPRESSION: 47-year-old female comes to the emergency department secondary to right-sided flank pain radiating to the right upper and the right lower quadrant of the abdomen. Some dysuria, leukocytosis, right-sided CVA on examination. CT abdomen pelvis showed a obstructive uropathy. Pyuria, moderate leukocytes, leukocytosis with a white blood count of 17.5, this increases concern for pyelonephritis and a superimposed urinary infection on top of a obstructing stone. Given the combination of the stone and obstruction and an infection, I believe this patient will be requiring a stent placement therefore an admission is recommended. Patient was started on full septic work-up and given IV Rocephin. Patient was also positive for trichomonas, was given a one-time dose of 2 g of Flagyl for the Trichomonas. And after discussion, prophylactic treatment for chlamydia was done with 1 g of azithromycin. Otherwise Rocephin will cover for any gonorrhea infections as well given that patient has 1 STD, she is at risk for others. RADIOLOGY:    Ct Abdomen Pelvis Wo Contrast    Result Date: 2/21/2020  EXAMINATION: CT OF THE ABDOMEN AND PELVIS WITHOUT CONTRAST 2/21/2020 12:15 am TECHNIQUE: CT of the abdomen and pelvis was performed without the administration of intravenous contrast. Multiplanar reformatted images are provided for review. Dose modulation, iterative reconstruction, and/or weight based adjustment of the mA/kV was utilized to reduce the radiation dose to as low as reasonably achievable. COMPARISON: None. HISTORY: ORDERING SYSTEM PROVIDED HISTORY: R flank pain radiating to the RUQ and RLQ. TECHNOLOGIST PROVIDED HISTORY: R flank pain radiating to the RUQ and RLQ. Is the patient pregnant?->No FINDINGS: Lower Chest: The lung bases are clear and the heart size is normal. Organs: The gallbladder is surgically absent. Bile ducts are not dilated. The liver, spleen, pancreas, adrenal glands and left kidney are normal.  The right kidney is enlarged and there is perinephric fat stranding. There is mild right hydronephrosis and hydroureter extending to an obstructing 2-3 mm calculus at the right UVJ (axial image 160, coronal image 56). No other evidence of urolithiasis. GI/Bowel: The appendix is normal.  Unopacified bowel loops are unremarkable. No bowel obstruction. Pelvis: There are bilateral Essure tubal occlusion devices. Uterus is mildly enlarged. Mild follicular prominence of both ovaries. Small amount of free low-attenuation fluid in the cul-de-sac. Peritoneum/Retroperitoneum: There is no adenopathy or free air. Bones/Soft Tissues: No acute bone or soft tissue abnormality. Acute right obstructive uropathy secondary to a 2-3 mm calculus at the right UVJ. Small amount of free fluid in the cul-de-sac, likely physiologic. Bilateral Essure tubal occlusion devices. Uterus is mildly enlarged.        EKG  None    All EKG's are interpreted by the Emergency Department Physician who either signs or Co-signs this chart in the absence of a cardiologist.    EMERGENCY DEPARTMENT COURSE:    Sepsis Times and Checklist  Vital Signs: BP: (!) 155/88  Pulse: 90  Resp: 14  Temp: 97.6 °F (36.4 °C) SpO2: 98 %  SIRS (>2)   Temp > 38.3C or < 36C   HR > 90   RR > 20   WBC > 12 or < 4 or >10% bands  SIRS (>2) and confirmed or suspected source of infection = Sepsis    Sepsis Identified   Date: 2/21/20  Time: 12:10 am  Sepsis Orders:   CBC: Yes   CMP: Yes   PT/PTT: Yes   Blood Cultures x2: Yes   Urinalysis and Urine Culture: Yes   Lactate: Yes   Broad Spectrum Antibiotics Given (within 3 hours of sepsis identification,

## 2020-02-21 NOTE — PROGRESS NOTES
Patient is a 37year old female here with UTI . She is alert and oriented. Patient is single and lives at home with her 12year old daughter. She uses no medical equipment. Patient does her own cooking and cleaning. She is independent with her ADL's. Patient manages her medication and her family provides the transportation. Her PCP is Abigail Duckworth CNP. She has medical insurance that helps with medication costs. The discharge plan is home with no services. She does not have advance directives. LSW to monitor and assist with any needs or concerns as they arise.     GURWINDER Bhagat

## 2020-02-21 NOTE — H&P (VIEW-ONLY)
non-distended, normal bowel sounds, no masses or organomegaly  EXT:   no cyanosis, clubbing or edema present    NEURO: follows commands, FRYE, no deficits  SKIN:  no rashes or significant lesions  -----------------------------------------------------------------  Diagnostic Data:   Lab Results   Component Value Date    WBC 17.5 (H) 02/20/2020    HGB 15.5 (H) 02/20/2020     02/20/2020       Lab Results   Component Value Date    BUN 13 02/20/2020    CREATININE 1.14 (H) 02/20/2020     02/20/2020    K 3.8 02/20/2020    CALCIUM 9.2 02/20/2020    CL 99 02/20/2020    CO2 23 02/20/2020    LABGLOM 53 (L) 02/20/2020       Lab Results   Component Value Date    WBCUA 5 TO 10 02/20/2020    RBCUA 2 TO 5 02/20/2020    EPITHUA 2 TO 5 02/20/2020    LEUKOCYTESUR MODERATE (A) 02/20/2020    SPECGRAV 1.030 (H) 02/20/2020    GLUCOSEU NEGATIVE 02/20/2020    KETUA NEGATIVE 02/20/2020    PROTEINU TRACE (A) 02/20/2020    HGBUR 3+ (A) 02/20/2020    CASTUA NOT REPORTED 02/20/2020    CRYSTUA 2 TO 5 CALCIUM OXALATE (A) 02/20/2020    BACTERIA 2+ (A) 02/20/2020    YEAST PRESENCE NOTED (A) 02/20/2020       Lab Results   Component Value Date    TROPONINT <0.03 11/16/2018    PROBNP 66 11/16/2018       Ct Abdomen Pelvis Wo Contrast    Result Date: 2/21/2020  EXAMINATION: CT OF THE ABDOMEN AND PELVIS WITHOUT CONTRAST 2/21/2020 12:15 am TECHNIQUE: CT of the abdomen and pelvis was performed without the administration of intravenous contrast. Multiplanar reformatted images are provided for review. Dose modulation, iterative reconstruction, and/or weight based adjustment of the mA/kV was utilized to reduce the radiation dose to as low as reasonably achievable. COMPARISON: None. HISTORY: ORDERING SYSTEM PROVIDED HISTORY: R flank pain radiating to the RUQ and RLQ. TECHNOLOGIST PROVIDED HISTORY: R flank pain radiating to the RUQ and RLQ.  Is the patient pregnant?->No FINDINGS: Lower Chest: The lung bases are clear and the heart size is normal. Organs: The gallbladder is surgically absent. Bile ducts are not dilated. The liver, spleen, pancreas, adrenal glands and left kidney are normal.  The right kidney is enlarged and there is perinephric fat stranding. There is mild right hydronephrosis and hydroureter extending to an obstructing 2-3 mm calculus at the right UVJ (axial image 160, coronal image 56). No other evidence of urolithiasis. GI/Bowel: The appendix is normal.  Unopacified bowel loops are unremarkable. No bowel obstruction. Pelvis: There are bilateral Essure tubal occlusion devices. Uterus is mildly enlarged. Mild follicular prominence of both ovaries. Small amount of free low-attenuation fluid in the cul-de-sac. Peritoneum/Retroperitoneum: There is no adenopathy or free air. Bones/Soft Tissues: No acute bone or soft tissue abnormality. Acute right obstructive uropathy secondary to a 2-3 mm calculus at the right UVJ. Small amount of free fluid in the cul-de-sac, likely physiologic. Bilateral Essure tubal occlusion devices. Uterus is mildly enlarged. Xr Abdomen (kub) (single Ap View)    Result Date: 2/21/2020  EXAMINATION: SPOT FLUOROSCOPIC IMAGES 2/21/2020 7:58 am TECHNIQUE: Fluoroscopy was provided by the radiology department for procedure. Radiologist was not present during examination. FLUOROSCOPY DOSE AND TYPE OR TIME AND EXPOSURES: 9 spot images were obtained. Total fluoroscopy time was 1 second. Fluoroscopy dose was 2.7 mGy. COMPARISON: CT on 02/21/2020 HISTORY: Intraprocedural imaging. FINDINGS: Cystoscopy and right ureteroscopy is performed. A right ureteral stent is placed. Bilateral Essure tubal occlusion coils noted. Right ureteral stent placement. See separate procedure report for more information. Assessment:    Active Problems:    UTI (urinary tract infection)  Resolved Problems:    * No resolved hospital problems.  *      Patient Active Problem List    Diagnosis Date Noted    UTI (urinary tract

## 2020-02-21 NOTE — ANESTHESIA PRE PROCEDURE
Department of Anesthesiology  Preprocedure Note       Name:  eTagan Hein   Age:  37 y.o.  :  1980                                          MRN:  [id number]         Date:  2020      Surgeon: Drew Diaz):  Raymond Crouch MD    Procedure: Masoud Fend INSERTION (Right )    Medications prior to admission:   Prior to Admission medications    Medication Sig Start Date End Date Taking? Authorizing Provider   ibuprofen (ADVIL;MOTRIN) 800 MG tablet Take 800 mg by mouth every 6 hours as needed for Pain   Yes Historical Provider, MD   Pseudoeph-Doxylamine-DM-APAP (NYQUIL PO) Take by mouth    Historical Provider, MD   Estradiol Cypionate (DEPO-ESTRADIOL IM) Inject 1 each into the muscle. Every 3 months. Historical Provider, MD       Current medications:    Current Facility-Administered Medications   Medication Dose Route Frequency Provider Last Rate Last Dose    [MAR Hold] ketorolac (TORADOL) injection 10 mg  10 mg Intravenous TID PRN Sriram Asif MD        [facility] Hold] 0.9 % sodium chloride infusion   Intravenous Continuous Sriram Asif  mL/hr at 20 1224         Allergies: Allergies   Allergen Reactions    Pcn [Penicillins] Nausea And Vomiting       Problem List:    Patient Active Problem List   Diagnosis Code    Hearing impaired person H91.90    UTI (urinary tract infection) N39.0       Past Medical History:        Diagnosis Date    Deaf        Past Surgical History:        Procedure Laterality Date    CHOLECYSTECTOMY  3-    TUBAL LIGATION         Social History:    Social History     Tobacco Use    Smoking status: Current Every Day Smoker     Packs/day: 0.50     Years: 15.00     Pack years: 7.50    Smokeless tobacco: Never Used   Substance Use Topics    Alcohol use:  Yes     Alcohol/week: 6.0 standard drinks     Types: 6 Cans of beer per week     Comment:  weekends occassionally                                Ready to quit: Not Answered  Counseling given: Not Answered      Vital Signs (Current):   Vitals:    02/21/20 0338 02/21/20 0500 02/21/20 0648 02/21/20 0711   BP: 137/68 111/74 108/60 129/87   Pulse: 88 69 83 57   Resp: 16 16 16 16   Temp: 37 °C (98.6 °F) 36.6 °C (97.8 °F) 36.4 °C (97.6 °F) 36.2 °C (97.1 °F)   TempSrc: Temporal Temporal Temporal Temporal   SpO2:  97% 98% 98%   Weight: 132 lb 8 oz (60.1 kg)   132 lb (59.9 kg)   Height:    5' 2\" (1.575 m)                                              BP Readings from Last 3 Encounters:   02/21/20 129/87   11/16/18 136/82   09/22/18 130/70       NPO Status: Time of last liquid consumption: 1800                        Time of last solid consumption: 1800                        Date of last liquid consumption: 02/20/20                        Date of last solid food consumption: 02/20/20    BMI:   Wt Readings from Last 3 Encounters:   02/21/20 132 lb (59.9 kg)   11/16/18 130 lb (59 kg)   09/22/18 130 lb (59 kg)     Body mass index is 24.14 kg/m². CBC:   Lab Results   Component Value Date    WBC 17.5 02/20/2020    RBC 5.00 02/20/2020    HGB 15.5 02/20/2020    HCT 46.6 02/20/2020    MCV 93.2 02/20/2020    RDW 12.3 02/20/2020     02/20/2020       CMP:   Lab Results   Component Value Date     02/20/2020    K 3.8 02/20/2020    CL 99 02/20/2020    CO2 23 02/20/2020    BUN 13 02/20/2020    CREATININE 1.14 02/20/2020    GFRAA >60 02/20/2020    LABGLOM 53 02/20/2020    GLUCOSE 105 02/20/2020    PROT 7.3 02/20/2020    CALCIUM 9.2 02/20/2020    BILITOT 0.28 02/20/2020    ALKPHOS 77 02/20/2020    AST 16 02/20/2020    ALT 11 02/20/2020       POC Tests: No results for input(s): POCGLU, POCNA, POCK, POCCL, POCBUN, POCHEMO, POCHCT in the last 72 hours.     Coags:   Lab Results   Component Value Date    PROTIME 9.6 02/20/2020    INR 0.9 02/20/2020    APTT 27.1 02/20/2020       HCG (If Applicable):   Lab Results   Component Value Date    PREGTESTUR NEGATIVE 12/05/2012        ABGs: No results found for: PHART, PO2ART, MDX6IYL, IUP3EJW, BEART, P6SREBKC     Type & Screen (If Applicable):  No results found for: [facility], [address]    Anesthesia Evaluation  Patient summary reviewed and Nursing notes reviewed no history of anesthetic complications:   Airway: Mallampati: II  TM distance: >3 FB   Neck ROM: full  Mouth opening: > = 3 FB Dental:          Pulmonary:normal exam    (+) current smoker          Patient did not smoke on day of surgery. Cardiovascular:  Exercise tolerance: good (>4 METS),         ECG reviewed                        Neuro/Psych:   Negative Neuro/Psych ROS              GI/Hepatic/Renal:        (-) GERD       Endo/Other: Negative Endo/Other ROS                    Abdominal:           Vascular: negative vascular ROS. Anesthesia Plan      general     ASA 2       Induction: intravenous. MIPS: Postoperative opioids intended and Prophylactic antiemetics administered.                       PEPE Contreras - CRNA   2/21/2020

## 2020-02-21 NOTE — DISCHARGE SUMMARY
Discharge Summary    Claribel Johns  :  1980  MRN:  [id number]    Admit date:  2020      Discharge date: 2020     Admitting Physician:  Joseph Pierce MD    Consultants:  Dr. Nguyen Bowles, urology    Procedures: Cystoscopy with right ureteral stent placement    Complications: none    Discharge Condition: stable    Hospital Course:   Claribel Johns is a 37 y.o. female admitted with Right Flank Pain.  Patient did present to the emergency department last night. Comfort Sullivan had right flank pain.  This is been ongoing. Comfort Sullivan has seen a chiropractor for this pain. Comfort Sullivan did have a CT scan performed.  This film was independently reviewed.  This does show a 3 mm stone in the right distal ureter.  Patient also had elevated white blood cell count of 17,500.  Patient's creatinine was slightly elevated but overall normal.  Patient has had no fevers.  She reports no history of stones. Pain is controlled with IV pain medication.  Patient currently is resting in bed. She just returned from a Cystoscopy with right renal stent placement. She denies pain at this time. Denies n/v. Tolerating diet and activity well. Denies hematuria but does have frequency. I explained with the stent, that will be a normal feeling. I will place her on 10 days of Bactrim for her UTI. She will be scheduled by Urology office for a Stone Therapy. .  Exam:  GEN:  alert and oriented to person, place and time, well-developed and well-nourished, in no acute distress  EYES: No gross abnormalities. , PERRL and EOMI  NECK: normal, supple, no lymphadenopathy,  no carotid bruits  PULM: clear to auscultation bilaterally- no wheezes, rales or rhonchi, normal air movement, no respiratory distress  COR: regular rate & rhythm, no murmurs, no gallops, S1 normal and S2 normal  ABD:  soft, non-tender, non-distended, normal bowel sounds, no masses or organomegaly  EXT:   no cyanosis, clubbing or edema present    NEURO: follows commands, FRYE, no deficits  SKIN:  no rashes or significant lesions    Significant Diagnostic Studies:   Lab Results   Component Value Date    WBC 17.5 (H) 02/20/2020    HGB 15.5 (H) 02/20/2020     02/20/2020       Lab Results   Component Value Date    BUN 13 02/20/2020    CREATININE 1.14 (H) 02/20/2020     02/20/2020    K 3.8 02/20/2020    CALCIUM 9.2 02/20/2020    CL 99 02/20/2020    CO2 23 02/20/2020    LABGLOM 53 (L) 02/20/2020       Lab Results   Component Value Date    WBCUA 5 TO 10 02/20/2020    RBCUA 2 TO 5 02/20/2020    EPITHUA 2 TO 5 02/20/2020    LEUKOCYTESUR MODERATE (A) 02/20/2020    SPECGRAV 1.030 (H) 02/20/2020    GLUCOSEU NEGATIVE 02/20/2020    KETUA NEGATIVE 02/20/2020    PROTEINU TRACE (A) 02/20/2020    HGBUR 3+ (A) 02/20/2020    CASTUA NOT REPORTED 02/20/2020    CRYSTUA 2 TO 5 CALCIUM OXALATE (A) 02/20/2020    BACTERIA 2+ (A) 02/20/2020    YEAST PRESENCE NOTED (A) 02/20/2020       Ct Abdomen Pelvis Wo Contrast    Result Date: 2/21/2020  EXAMINATION: CT OF THE ABDOMEN AND PELVIS WITHOUT CONTRAST 2/21/2020 12:15 am TECHNIQUE: CT of the abdomen and pelvis was performed without the administration of intravenous contrast. Multiplanar reformatted images are provided for review. Dose modulation, iterative reconstruction, and/or weight based adjustment of the mA/kV was utilized to reduce the radiation dose to as low as reasonably achievable. COMPARISON: None. HISTORY: ORDERING SYSTEM PROVIDED HISTORY: R flank pain radiating to the RUQ and RLQ. TECHNOLOGIST PROVIDED HISTORY: R flank pain radiating to the RUQ and RLQ. Is the patient pregnant?->No FINDINGS: Lower Chest: The lung bases are clear and the heart size is normal. Organs: The gallbladder is surgically absent. Bile ducts are not dilated. The liver, spleen, pancreas, adrenal glands and left kidney are normal.  The right kidney is enlarged and there is perinephric fat stranding.   There is mild right hydronephrosis and hydroureter extending to an obstructing 2-3 mm calculus at the right UVJ (axial image 160, coronal image 56). No other evidence of urolithiasis. GI/Bowel: The appendix is normal.  Unopacified bowel loops are unremarkable. No bowel obstruction. Pelvis: There are bilateral Essure tubal occlusion devices. Uterus is mildly enlarged. Mild follicular prominence of both ovaries. Small amount of free low-attenuation fluid in the cul-de-sac. Peritoneum/Retroperitoneum: There is no adenopathy or free air. Bones/Soft Tissues: No acute bone or soft tissue abnormality. Acute right obstructive uropathy secondary to a 2-3 mm calculus at the right UVJ. Small amount of free fluid in the cul-de-sac, likely physiologic. Bilateral Essure tubal occlusion devices. Uterus is mildly enlarged. Xr Abdomen (kub) (single Ap View)    Result Date: 2/21/2020  EXAMINATION: SPOT FLUOROSCOPIC IMAGES 2/21/2020 7:58 am TECHNIQUE: Fluoroscopy was provided by the radiology department for procedure. Radiologist was not present during examination. FLUOROSCOPY DOSE AND TYPE OR TIME AND EXPOSURES: 9 spot images were obtained. Total fluoroscopy time was 1 second. Fluoroscopy dose was 2.7 mGy. COMPARISON: CT on 02/21/2020 HISTORY: Intraprocedural imaging. FINDINGS: Cystoscopy and right ureteroscopy is performed. A right ureteral stent is placed. Bilateral Essure tubal occlusion coils noted. Right ureteral stent placement. See separate procedure report for more information. Discharge Diagnoses:    Principal Problem:    UTI (urinary tract infection)  Active Problems:    Renal calculus, right  Resolved Problems:    * No resolved hospital problems.  *      Active Hospital Problems    Diagnosis Date Noted    Renal calculus, right [N20.0] 02/21/2020     Priority: High     Class: Acute    UTI (urinary tract infection) [N39.0] 02/21/2020       Discharge Medications:       Allen Swift   Home Medication Instructions EAS:[id number]    Printed on:02/21/20 3426 Medication Information                      Estradiol Cypionate (DEPO-ESTRADIOL IM)  Inject 1 each into the muscle. Every 3 months. ibuprofen (ADVIL;MOTRIN) 800 MG tablet  Take 800 mg by mouth every 6 hours as needed for Pain             Pseudoeph-Doxylamine-DM-APAP (NYQUIL PO)  Take by mouth             sulfamethoxazole-trimethoprim (BACTRIM DS;SEPTRA DS) 800-160 MG per tablet  Take 1 tablet by mouth 2 times daily for 10 days                 Patient Instructions: Activity: activity as tolerated  Diet: encourage fluids  Wound Care: none needed  Other: Leave ureteral stent in place. Stone therapy as an outpt.       Disposition:   Discharge to Home    Follow up:  Patient will be followed by PEPE Marie CNP in 1-2 weeks    CORE MEASURES on Discharge (if applicable)  ACE/ARB in CHF: NA  Statin in MI: NA  ASA in MI: NA  Statin in CVA: NA  Antiplatelet in CVA: NA    Total time spent on discharge services: 45 minutes    Including the following activities:  Evaluation and Management of patient  Discussion with patient and/or surrogate about current care plan  Coordination with Case Management and/or   Coordination of care with Consultants (if applicable)   Coordination of care with Receiving Facility Physician (if applicable)  Completion of DME forms (if applicable)  Preparation of Discharge Summary  Preparation of Medication Reconciliation  Preparation of Discharge Prescriptions    Signed:  PEPE Fuller, NP-C  2/21/2020, 11:39 AM

## 2020-02-22 LAB
CULTURE: NORMAL
Lab: NORMAL
SPECIMEN DESCRIPTION: NORMAL

## 2020-02-26 LAB
CULTURE: NORMAL
CULTURE: NORMAL
Lab: NORMAL
Lab: NORMAL
SPECIMEN DESCRIPTION: NORMAL
SPECIMEN DESCRIPTION: NORMAL

## 2020-03-01 PROBLEM — N20.1 URETERAL CALCULUS: Status: ACTIVE | Noted: 2020-03-01

## 2020-03-02 ENCOUNTER — ANESTHESIA EVENT (OUTPATIENT)
Dept: OPERATING ROOM | Age: 40
End: 2020-03-02
Payer: COMMERCIAL

## 2020-03-03 ENCOUNTER — HOSPITAL ENCOUNTER (OUTPATIENT)
Age: 40
Setting detail: OUTPATIENT SURGERY
Discharge: HOME | End: 2020-03-03
Attending: UROLOGY | Admitting: UROLOGY
Payer: COMMERCIAL

## 2020-03-03 ENCOUNTER — ANESTHESIA (OUTPATIENT)
Dept: OPERATING ROOM | Age: 40
End: 2020-03-03
Payer: COMMERCIAL

## 2020-03-03 ENCOUNTER — APPOINTMENT (OUTPATIENT)
Dept: GENERAL RADIOLOGY | Age: 40
End: 2020-03-03
Attending: UROLOGY
Payer: COMMERCIAL

## 2020-03-03 VITALS
TEMPERATURE: 97.6 F | BODY MASS INDEX: 24.84 KG/M2 | WEIGHT: 135 LBS | RESPIRATION RATE: 16 BRPM | SYSTOLIC BLOOD PRESSURE: 133 MMHG | HEIGHT: 62 IN | OXYGEN SATURATION: 96 % | HEART RATE: 82 BPM | DIASTOLIC BLOOD PRESSURE: 77 MMHG

## 2020-03-03 VITALS
OXYGEN SATURATION: 98 % | DIASTOLIC BLOOD PRESSURE: 63 MMHG | RESPIRATION RATE: 9 BRPM | TEMPERATURE: 96.9 F | SYSTOLIC BLOOD PRESSURE: 130 MMHG

## 2020-03-03 LAB — HCG(URINE) PREGNANCY TEST: NEGATIVE

## 2020-03-03 PROCEDURE — 2580000003 HC RX 258: Performed by: UROLOGY

## 2020-03-03 PROCEDURE — 3700000001 HC ADD 15 MINUTES (ANESTHESIA): Performed by: UROLOGY

## 2020-03-03 PROCEDURE — 3600000004 HC SURGERY LEVEL 4 BASE: Performed by: UROLOGY

## 2020-03-03 PROCEDURE — 2500000003 HC RX 250 WO HCPCS: Performed by: NURSE ANESTHETIST, CERTIFIED REGISTERED

## 2020-03-03 PROCEDURE — 6370000000 HC RX 637 (ALT 250 FOR IP): Performed by: UROLOGY

## 2020-03-03 PROCEDURE — C1887 CATHETER, GUIDING: HCPCS | Performed by: UROLOGY

## 2020-03-03 PROCEDURE — 82365 CALCULUS SPECTROSCOPY: CPT

## 2020-03-03 PROCEDURE — 2720000010 HC SURG SUPPLY STERILE: Performed by: UROLOGY

## 2020-03-03 PROCEDURE — 2709999900 HC NON-CHARGEABLE SUPPLY: Performed by: UROLOGY

## 2020-03-03 PROCEDURE — 3700000000 HC ANESTHESIA ATTENDED CARE: Performed by: UROLOGY

## 2020-03-03 PROCEDURE — 7100000001 HC PACU RECOVERY - ADDTL 15 MIN: Performed by: UROLOGY

## 2020-03-03 PROCEDURE — 81025 URINE PREGNANCY TEST: CPT

## 2020-03-03 PROCEDURE — 7100000000 HC PACU RECOVERY - FIRST 15 MIN: Performed by: UROLOGY

## 2020-03-03 PROCEDURE — 74018 RADEX ABDOMEN 1 VIEW: CPT

## 2020-03-03 PROCEDURE — 6360000002 HC RX W HCPCS: Performed by: UROLOGY

## 2020-03-03 PROCEDURE — 7100000011 HC PHASE II RECOVERY - ADDTL 15 MIN: Performed by: UROLOGY

## 2020-03-03 PROCEDURE — 6360000002 HC RX W HCPCS: Performed by: NURSE ANESTHETIST, CERTIFIED REGISTERED

## 2020-03-03 PROCEDURE — 3600000014 HC SURGERY LEVEL 4 ADDTL 15MIN: Performed by: UROLOGY

## 2020-03-03 PROCEDURE — C1769 GUIDE WIRE: HCPCS | Performed by: UROLOGY

## 2020-03-03 PROCEDURE — 7100000010 HC PHASE II RECOVERY - FIRST 15 MIN: Performed by: UROLOGY

## 2020-03-03 PROCEDURE — C2617 STENT, NON-COR, TEM W/O DEL: HCPCS | Performed by: UROLOGY

## 2020-03-03 DEVICE — URETERAL STENT
Type: IMPLANTABLE DEVICE | Site: URETER | Status: FUNCTIONAL
Brand: PERCUFLEX™ PLUS

## 2020-03-03 RX ORDER — FENTANYL CITRATE 50 UG/ML
50 INJECTION, SOLUTION INTRAMUSCULAR; INTRAVENOUS EVERY 5 MIN PRN
Status: DISCONTINUED | OUTPATIENT
Start: 2020-03-03 | End: 2020-03-03 | Stop reason: HOSPADM

## 2020-03-03 RX ORDER — ONDANSETRON 2 MG/ML
4 INJECTION INTRAMUSCULAR; INTRAVENOUS
Status: DISCONTINUED | OUTPATIENT
Start: 2020-03-03 | End: 2020-03-03 | Stop reason: HOSPADM

## 2020-03-03 RX ORDER — SODIUM CHLORIDE 0.9 % (FLUSH) 0.9 %
10 SYRINGE (ML) INJECTION EVERY 12 HOURS SCHEDULED
Status: DISCONTINUED | OUTPATIENT
Start: 2020-03-03 | End: 2020-03-03 | Stop reason: HOSPADM

## 2020-03-03 RX ORDER — CIPROFLOXACIN 2 MG/ML
400 INJECTION, SOLUTION INTRAVENOUS
Status: COMPLETED | OUTPATIENT
Start: 2020-03-03 | End: 2020-03-03

## 2020-03-03 RX ORDER — FENTANYL CITRATE 50 UG/ML
INJECTION, SOLUTION INTRAMUSCULAR; INTRAVENOUS PRN
Status: DISCONTINUED | OUTPATIENT
Start: 2020-03-03 | End: 2020-03-03 | Stop reason: SDUPTHER

## 2020-03-03 RX ORDER — DIMENHYDRINATE 50 MG/1
50 TABLET ORAL ONCE
Status: COMPLETED | OUTPATIENT
Start: 2020-03-03 | End: 2020-03-03

## 2020-03-03 RX ORDER — ACETAMINOPHEN 325 MG/1
650 TABLET ORAL ONCE
Status: COMPLETED | OUTPATIENT
Start: 2020-03-03 | End: 2020-03-03

## 2020-03-03 RX ORDER — LIDOCAINE HYDROCHLORIDE 20 MG/ML
INJECTION, SOLUTION EPIDURAL; INFILTRATION; INTRACAUDAL; PERINEURAL PRN
Status: DISCONTINUED | OUTPATIENT
Start: 2020-03-03 | End: 2020-03-03 | Stop reason: SDUPTHER

## 2020-03-03 RX ORDER — HYDROCODONE BITARTRATE AND ACETAMINOPHEN 5; 325 MG/1; MG/1
1 TABLET ORAL PRN
Status: DISCONTINUED | OUTPATIENT
Start: 2020-03-03 | End: 2020-03-03 | Stop reason: HOSPADM

## 2020-03-03 RX ORDER — EPHEDRINE SULFATE/0.9% NACL/PF 50 MG/5 ML
SYRINGE (ML) INTRAVENOUS PRN
Status: DISCONTINUED | OUTPATIENT
Start: 2020-03-03 | End: 2020-03-03 | Stop reason: SDUPTHER

## 2020-03-03 RX ORDER — SODIUM CHLORIDE, SODIUM LACTATE, POTASSIUM CHLORIDE, CALCIUM CHLORIDE 600; 310; 30; 20 MG/100ML; MG/100ML; MG/100ML; MG/100ML
INJECTION, SOLUTION INTRAVENOUS CONTINUOUS
Status: DISCONTINUED | OUTPATIENT
Start: 2020-03-03 | End: 2020-03-03 | Stop reason: HOSPADM

## 2020-03-03 RX ORDER — SODIUM CHLORIDE 0.9 % (FLUSH) 0.9 %
10 SYRINGE (ML) INJECTION PRN
Status: DISCONTINUED | OUTPATIENT
Start: 2020-03-03 | End: 2020-03-03 | Stop reason: HOSPADM

## 2020-03-03 RX ORDER — ONDANSETRON 2 MG/ML
INJECTION INTRAMUSCULAR; INTRAVENOUS PRN
Status: DISCONTINUED | OUTPATIENT
Start: 2020-03-03 | End: 2020-03-03 | Stop reason: SDUPTHER

## 2020-03-03 RX ORDER — PROPOFOL 10 MG/ML
INJECTION, EMULSION INTRAVENOUS PRN
Status: DISCONTINUED | OUTPATIENT
Start: 2020-03-03 | End: 2020-03-03 | Stop reason: SDUPTHER

## 2020-03-03 RX ORDER — HYDROCODONE BITARTRATE AND ACETAMINOPHEN 5; 325 MG/1; MG/1
2 TABLET ORAL PRN
Status: DISCONTINUED | OUTPATIENT
Start: 2020-03-03 | End: 2020-03-03 | Stop reason: HOSPADM

## 2020-03-03 RX ORDER — DEXAMETHASONE SODIUM PHOSPHATE 4 MG/ML
INJECTION, SOLUTION INTRA-ARTICULAR; INTRALESIONAL; INTRAMUSCULAR; INTRAVENOUS; SOFT TISSUE PRN
Status: DISCONTINUED | OUTPATIENT
Start: 2020-03-03 | End: 2020-03-03 | Stop reason: SDUPTHER

## 2020-03-03 RX ORDER — LIDOCAINE HYDROCHLORIDE 20 MG/ML
JELLY TOPICAL PRN
Status: DISCONTINUED | OUTPATIENT
Start: 2020-03-03 | End: 2020-03-03 | Stop reason: ALTCHOICE

## 2020-03-03 RX ADMIN — ONDANSETRON 4 MG: 2 INJECTION INTRAMUSCULAR; INTRAVENOUS at 11:19

## 2020-03-03 RX ADMIN — SODIUM CHLORIDE, POTASSIUM CHLORIDE, SODIUM LACTATE AND CALCIUM CHLORIDE: 600; 310; 30; 20 INJECTION, SOLUTION INTRAVENOUS at 09:44

## 2020-03-03 RX ADMIN — DEXAMETHASONE SODIUM PHOSPHATE 8 MG: 4 INJECTION, SOLUTION INTRAMUSCULAR; INTRAVENOUS at 11:19

## 2020-03-03 RX ADMIN — Medication 10 MG: at 11:16

## 2020-03-03 RX ADMIN — DIMENHYDRINATE 50 MG: 50 TABLET ORAL at 09:44

## 2020-03-03 RX ADMIN — Medication 10 MG: at 11:19

## 2020-03-03 RX ADMIN — LIDOCAINE HYDROCHLORIDE 80 MG: 20 INJECTION, SOLUTION EPIDURAL; INFILTRATION; INTRACAUDAL at 10:58

## 2020-03-03 RX ADMIN — CIPROFLOXACIN 400 MG: 2 INJECTION, SOLUTION INTRAVENOUS at 10:51

## 2020-03-03 RX ADMIN — ACETAMINOPHEN 650 MG: 325 TABLET, FILM COATED ORAL at 09:44

## 2020-03-03 RX ADMIN — FENTANYL CITRATE 50 MCG: 50 INJECTION INTRAMUSCULAR; INTRAVENOUS at 10:58

## 2020-03-03 RX ADMIN — FENTANYL CITRATE 50 MCG: 50 INJECTION INTRAMUSCULAR; INTRAVENOUS at 11:21

## 2020-03-03 RX ADMIN — PROPOFOL 150 MG: 10 INJECTION, EMULSION INTRAVENOUS at 10:58

## 2020-03-03 ASSESSMENT — PAIN SCALES - GENERAL
PAINLEVEL_OUTOF10: 0

## 2020-03-03 ASSESSMENT — PULMONARY FUNCTION TESTS
PIF_VALUE: 3
PIF_VALUE: 14
PIF_VALUE: 14
PIF_VALUE: 2
PIF_VALUE: 17
PIF_VALUE: 14
PIF_VALUE: 13
PIF_VALUE: 0
PIF_VALUE: 16
PIF_VALUE: 12
PIF_VALUE: 13
PIF_VALUE: 14
PIF_VALUE: 0
PIF_VALUE: 14
PIF_VALUE: 0
PIF_VALUE: 1
PIF_VALUE: 1
PIF_VALUE: 0
PIF_VALUE: 12
PIF_VALUE: 14
PIF_VALUE: 10
PIF_VALUE: 0
PIF_VALUE: 22
PIF_VALUE: 0
PIF_VALUE: 14
PIF_VALUE: 14
PIF_VALUE: 13
PIF_VALUE: 1
PIF_VALUE: 3
PIF_VALUE: 13
PIF_VALUE: 13
PIF_VALUE: 12
PIF_VALUE: 14
PIF_VALUE: 14
PIF_VALUE: 10
PIF_VALUE: 15
PIF_VALUE: 14
PIF_VALUE: 14
PIF_VALUE: 12

## 2020-03-03 ASSESSMENT — PAIN - FUNCTIONAL ASSESSMENT: PAIN_FUNCTIONAL_ASSESSMENT: 0-10

## 2020-03-03 ASSESSMENT — LIFESTYLE VARIABLES: SMOKING_STATUS: 1

## 2020-03-03 NOTE — ANESTHESIA PRE PROCEDURE
Department of Anesthesiology  Preprocedure Note       Name:  Terry Alicea   Age:  37 y.o.  :  1980                                          MRN:  [id number]         Date:  3/3/2020      Surgeon: Isabel Call):  Issac Miller MD    Procedure: CYSTOSCOPY URETEROSCOPY LASER-HLL (Right )  CYSTOSCOPY RETROGRADE PYELOGRAM STENT INSERTION (Right )    Medications prior to admission:   Prior to Admission medications    Medication Sig Start Date End Date Taking? Authorizing Provider   ibuprofen (ADVIL;MOTRIN) 800 MG tablet Take 800 mg by mouth every 6 hours as needed for Pain    Historical Provider, MD Carv[place]al-Doxylamine-DM-APAP (NYQUIL PO) Take by mouth    Historical Provider, MD   Estradiol Cypionate (DEPO-ESTRADIOL IM) Inject 1 each into the muscle. Every 3 months. Historical Provider, MD       Current medications:    Current Facility-Administered Medications   Medication Dose Route Frequency Provider Last Rate Last Dose    lactated ringers infusion   Intravenous Continuous Issac Miller  mL/hr at 20 0944      sodium chloride flush 0.9 % injection 10 mL  10 mL Intravenous 2 times per day Issac Miller MD        sodium chloride flush 0.9 % injection 10 mL  10 mL Intravenous PRN Issac Miller MD        ciprofloxacin (CIPRO) IVPB 400 mg  400 mg Intravenous On Call to Niya Cui MD           Allergies:     Allergies   Allergen Reactions    Pcn [Penicillins] Nausea And Vomiting       Problem List:    Patient Active Problem List   Diagnosis Code    Hearing impaired person H91.90    UTI (urinary tract infection) N39.0    Renal calculus, right N20.0    Ureteral calculus N20.1       Past Medical History:        Diagnosis Date    Deaf        Past Surgical History:        Procedure Laterality Date    CHOLECYSTECTOMY  3-    CYSTOSCOPY  2020    with stent placement    CYSTOSCOPY INSERTION / REMOVAL STENT / STONE Right 2020    CYSTOSCOPY  STENT INSERTION performed by Petar Back MD at [address]         Social History:    Social History     Tobacco Use    Smoking status: Current Every Day Smoker     Packs/day: 0.50     Years: 15.00     Pack years: 7.50    Smokeless tobacco: Never Used   Substance Use Topics    Alcohol use: Yes     Alcohol/week: 6.0 standard drinks     Types: 6 Cans of beer per week     Comment:  weekends occassionally                                Ready to quit: Not Answered  Counseling given: Not Answered      Vital Signs (Current):   Vitals:    02/25/20 1452 03/03/20 0933   BP:  129/79   Pulse:  78   Resp:  18   Temp:  36.2 °C (97.1 °F)   TempSrc:  Temporal   SpO2:  97%   Weight: 135 lb (61.2 kg) 135 lb (61.2 kg)   Height: 5' 2\" (1.575 m) 5' 2\" (1.575 m)                                              BP Readings from Last 3 Encounters:   03/03/20 129/79   02/21/20 129/72   02/21/20 (!) 156/76       NPO Status: Time of last liquid consumption: 2100                        Time of last solid consumption: 1730                        Date of last liquid consumption: 03/02/20                        Date of last solid food consumption: 03/02/20    BMI:   Wt Readings from Last 3 Encounters:   03/03/20 135 lb (61.2 kg)   02/21/20 132 lb (59.9 kg)   11/16/18 130 lb (59 kg)     Body mass index is 24.69 kg/m².     CBC:   Lab Results   Component Value Date    WBC 17.5 02/20/2020    RBC 5.00 02/20/2020    HGB 15.5 02/20/2020    HCT 46.6 02/20/2020    MCV 93.2 02/20/2020    RDW 12.3 02/20/2020     02/20/2020       CMP:   Lab Results   Component Value Date     02/20/2020    K 3.8 02/20/2020    CL 99 02/20/2020    CO2 23 02/20/2020    BUN 13 02/20/2020    CREATININE 1.14 02/20/2020    GFRAA >60 02/20/2020    LABGLOM 53 02/20/2020    GLUCOSE 105 02/20/2020    PROT 7.3 02/20/2020    CALCIUM 9.2 02/20/2020    BILITOT 0.28 02/20/2020    ALKPHOS 77 02/20/2020    AST 16 02/20/2020    ALT 11 02/20/2020       POC Tests: No results for input(s): POCGLU, POCNA, POCK, POCCL, POCBUN, POCHEMO, POCHCT in the last 72 hours. Coags:   Lab Results   Component Value Date    PROTIME 9.6 02/20/2020    INR 0.9 02/20/2020    APTT 27.1 02/20/2020       HCG (If Applicable):   Lab Results   Component Value Date    PREGTESTUR NEGATIVE 03/03/2020        ABGs: No results found for: PHART, PO2ART, ILK0MEW, CYC1GDW, BEART, A8NLTSQS     Type & Screen (If Applicable):  No results found for: [facility]    Anesthesia Evaluation  Patient summary reviewed and Nursing notes reviewed  Airway: Mallampati: I  TM distance: >3 FB   Neck ROM: full  Mouth opening: > = 3 FB Dental: normal exam         Pulmonary:normal exam    (+) current smoker          Patient did not smoke on day of surgery. Cardiovascular:Negative CV ROS  Exercise tolerance: good (>4 METS),         ECG reviewed                        Neuro/Psych:   Negative Neuro/Psych ROS              GI/Hepatic/Renal: Neg GI/Hepatic/Renal ROS  (+) renal disease: kidney stones,      (-) GERD       Endo/Other: Negative Endo/Other ROS                    Abdominal:           Vascular: negative vascular ROS. Anesthesia Plan      general     ASA 2       Induction: intravenous. MIPS: Prophylactic antiemetics administered. Anesthetic plan and risks discussed with patient, child/children and sibling.                       PEPE Loja - CRNA   3/3/2020

## 2020-03-03 NOTE — BRIEF OP NOTE
Brief Postoperative Note  ______________________________________________________________    Patient: Darell Nichols  YOB: 1980  MRN: [id number]  Date of Procedure: 3/3/2020    Pre-Op Diagnosis: RIGHT URETERAL CALCULUS    Post-Op Diagnosis: Same       Procedure(s):  CYSTOSCOPY URETEROSCOPY LASER-HLL  CYSTOSCOPY RETROGRADE PYELOGRAM STENT INSERTION    Anesthesia: General    Surgeon(s):  Leighann Cook MD    Assistant: none    Estimated Blood Loss (mL): less than 50     Complications: None    Specimens:   ID Type Source Tests Collected by Time Destination   1 :  Stone (Calculus) Kidney STONE ANALYSIS Leighann Cook MD 3/3/2020 1126        Implants:  Implant Name Type Inv.  Item Serial No.  Lot No. LRB No. Used   STENT URET HYDRPL COAT W/O [id number] Stent:Urological STENT URET HYDRPL COAT W/O [id number]  [employer]: INTERVENTIONAL CARDIO [id number] Right 1         Drains: * No LDAs found *    Findings: 3mm distal right ureteral calculus    Leighann Cook MD  Date: 3/3/2020  Time: 11:31 AM

## 2020-03-03 NOTE — PROGRESS NOTES

## 2020-03-03 NOTE — ANESTHESIA POSTPROCEDURE EVALUATION
Department of Anesthesiology  Postprocedure Note    Patient: Lizabeth Santacruz  MRN: [id number]  YOB: 1980  Date of evaluation: 3/3/2020  Time:  2:39 PM     Procedure Summary     Date:  03/03/20 Room / Location:  [facility]    Anesthesia Start:  1053 Anesthesia Stop:  1136    Procedures:       CYSTOSCOPY URETEROSCOPY LASER-HLL (Right )      CYSTOSCOPY RETROGRADE PYELOGRAM STENT INSERTION (Right ) Diagnosis:  (RIGHT URETERAL CALCULUS)    Surgeon:  Ophelia Cruz MD Responsible Provider:  PEPE Swan CRNA    Anesthesia Type:  general ASA Status:  2          Anesthesia Type: general    Peri Phase I: Peri Score: 10    Peri Phase II: Peri Score: 10    Last vitals: Reviewed and per EMR flowsheets.        Anesthesia Post Evaluation    Patient location during evaluation: PACU  Patient participation: complete - patient participated  Level of consciousness: awake and alert  Airway patency: patent  Nausea & Vomiting: no nausea and no vomiting  Complications: no  Cardiovascular status: blood pressure returned to baseline and hemodynamically stable  Respiratory status: acceptable and room air  Hydration status: euvolemic

## 2020-03-04 NOTE — OP NOTE
[address]                                OPERATIVE REPORT    PATIENT NAME: Gogo Jeronimo                 :        1980  MED REC NO:   [id number]                              ROOM:  ACCOUNT NO:   [de-identified]                           ADMIT DATE: 2020  PROVIDER:     Wale Abreu    DATE OF PROCEDURE:  2020    SURGEON:  Dr. Wale Abreu. ASSISTANT:  None. PREOPERATIVE DIAGNOSIS:  Right ureteral calculus. POSTOPERATIVE DIAGNOSIS:  Right ureteral calculus. PROCEDURE PERFORMED:  Cystoscopy, right ureteroscopy, right holmium  laser lithotripsy, right basket extraction of the calculi, right  ureteral stent exchange. ANESTHESIA:  General.    COMPLICATIONS:  None. ESTIMATED BLOOD LOSS:  Minimal.    SPECIMENS:  Right ureteral stone. PROSTHESIS:  A 6-Thai 24-cm double-J ureteral stent. DISPOSITION:  Stable. FINDINGS:  A 3-mm mid ureteral stone on the right. INDICATIONS:  The patient is a 68-year-old female who had a distal  stone, did have a stent placed, here now for definitive therapy in the  form of holmium laser lithotripsy. DESCRIPTION OF PROCEDURE:  The patient was taken back to the operating  room after informed consent including all risks, benefits, and  alternatives were obtained. The patient was transferred from the [facility]  onto the operative table, where she was induced under general  anesthesia. She was given IV Cipro for preoperative antibiotic  prophylaxis. To begin the case, she was prepped and draped in the  normal sterile fashion and placed in dorsal lithotomy. She had a  22-Thai sheath with a 30-degree lens passed through the urethra into  the bladder. Once in the bladder, we identified the stent, grasped  this, removed this to the meatus. A 0.035-inch wire was passed up. We  then removed the stent.   We were then able to place a

## 2020-03-05 ENCOUNTER — OFFICE VISIT (OUTPATIENT)
Dept: UROLOGY | Age: 40
End: 2020-03-05
Payer: COMMERCIAL

## 2020-03-05 VITALS
DIASTOLIC BLOOD PRESSURE: 94 MMHG | SYSTOLIC BLOOD PRESSURE: 130 MMHG | WEIGHT: 137 LBS | TEMPERATURE: 98.2 F | BODY MASS INDEX: 25.06 KG/M2

## 2020-03-05 PROCEDURE — G8484 FLU IMMUNIZE NO ADMIN: HCPCS | Performed by: NURSE PRACTITIONER

## 2020-03-05 PROCEDURE — G8419 CALC BMI OUT NRM PARAM NOF/U: HCPCS | Performed by: NURSE PRACTITIONER

## 2020-03-05 PROCEDURE — 99213 OFFICE O/P EST LOW 20 MIN: CPT | Performed by: NURSE PRACTITIONER

## 2020-03-05 PROCEDURE — 4004F PT TOBACCO SCREEN RCVD TLK: CPT | Performed by: NURSE PRACTITIONER

## 2020-03-05 PROCEDURE — G8427 DOCREV CUR MEDS BY ELIG CLIN: HCPCS | Performed by: NURSE PRACTITIONER

## 2020-03-05 ASSESSMENT — ENCOUNTER SYMPTOMS
BACK PAIN: 0
VOMITING: 0
EYE REDNESS: 0
COLOR CHANGE: 0
COUGH: 0
WHEEZING: 0
ABDOMINAL PAIN: 0
CONSTIPATION: 0
SHORTNESS OF BREATH: 0
NAUSEA: 0
EYE PAIN: 0

## 2020-03-05 NOTE — PROGRESS NOTES
Pt had ureteral stent placed on 3/3/2020 following right HLL. Stent removed via string in office today without difficulty. Pt tolerated removal well.
Social History     Tobacco Use   Smoking Status Current Every Day Smoker    Packs/day: 0.50    Years: 15.00    Pack years: 7.50   Smokeless Tobacco Never Used       Social History     Substance and Sexual Activity   Alcohol Use Yes    Alcohol/week: 6.0 standard drinks    Types: 6 Cans of beer per week    Comment:  weekends occassionally       Review of Systems   Constitutional: Negative for appetite change, chills and fever. Eyes: Negative for pain, redness and visual disturbance. Respiratory: Negative for cough, shortness of breath and wheezing. Cardiovascular: Negative for chest pain and leg swelling. Gastrointestinal: Negative for abdominal pain, constipation, nausea and vomiting. Genitourinary: Negative for difficulty urinating, dysuria, flank pain, frequency, hematuria, pelvic pain, urgency, vaginal bleeding and vaginal discharge. Musculoskeletal: Negative for back pain, joint swelling and myalgias. Skin: Negative for color change, rash and wound. Neurological: Negative for dizziness, tremors and numbness. Hematological: Negative for adenopathy. Does not bruise/bleed easily. BP (!) 130/94 (Site: Right Upper Arm, Position: Sitting, Cuff Size: Medium Adult)   Temp 98.2 °F (36.8 °C) (Oral)   Wt 137 lb (62.1 kg)   LMP 02/24/2020   BMI 25.06 kg/m²       PHYSICAL EXAM:  Constitutional: Patient resting comfortably, in no acute distress. Neuro: Alert and oriented to person place and time. Cranial nerves grossly intact. Psych: Mood and affect normal.  Skin: Warm, dry  HEENT: normocephalic, atraumatic  Lymphatics: No palpable lymphadenopathy  Lungs: Respiratory effort normal, unlabored  Cardiovascular:  Normal peripheral pulses  Abdomen: Soft, non-tender, non-distended with no organomegaly or palpable masses. : No CVA tenderness. Bladder non-tender and not distended.   Pelvic: Deferred    Lab Results   Component Value Date    BUN 13 02/20/2020     Lab Results   Component Value

## 2020-03-05 NOTE — PATIENT INSTRUCTIONS
You may experience waves of pain and/or nausea for the next 24-72 hrs. You may also experience burning with urination, frequency, urgency, bladder spasms, and blood in the urine. All of this should continue to improve over the next several days. The blood in the urine can last up to two weeks. 1) take ibuprofen (motrin) 600 mg (3 of the 200mg tabs) every 6 hours WITH FOOD for the next 72 hours. 2) drink at least 80 oz fluid (water, juice, Gatorade - NOT tea, coffee, soda pop) daily        Call our office [phone] or go to ER (if after normal office hours) if you develop fever, intractable vomiting, severe/intolerable pain.

## 2020-03-06 LAB
STONE COMPOSITION: NORMAL
STONE DESCRIPTION: NORMAL
STONE MASS: 8 MG
STONE NUMBER: 1
STONE SIZE: NORMAL MM

## 2020-03-22 PROBLEM — N39.0 UTI (URINARY TRACT INFECTION): Status: RESOLVED | Noted: 2020-02-21 | Resolved: 2020-03-22

## 2020-04-14 ENCOUNTER — TELEPHONE (OUTPATIENT)
Dept: UROLOGY | Age: 40
End: 2020-04-14

## 2020-04-14 NOTE — TELEPHONE ENCOUNTER
She had a Cystoscopy, right ureteroscopy, right holmium laser lithotripsy, right basket extraction of the calculi, right ureteral stent exchange for a 3mm stone. On prior CT scan had not other stones. She can come back in 4-6 weeks and get KUB prior to appt. Adrian Scott

## 2021-02-02 ENCOUNTER — HOSPITAL ENCOUNTER (EMERGENCY)
Age: 41
Discharge: HOME | End: 2021-02-02
Payer: COMMERCIAL

## 2021-02-02 VITALS
BODY MASS INDEX: 23.78 KG/M2 | TEMPERATURE: 97.4 F | WEIGHT: 130 LBS | OXYGEN SATURATION: 99 % | HEART RATE: 68 BPM | DIASTOLIC BLOOD PRESSURE: 78 MMHG | RESPIRATION RATE: 16 BRPM | SYSTOLIC BLOOD PRESSURE: 124 MMHG

## 2021-02-02 DIAGNOSIS — K04.7 DENTAL ABSCESS: Primary | ICD-10-CM

## 2021-02-02 PROCEDURE — 99283 EMERGENCY DEPT VISIT LOW MDM: CPT

## 2021-02-02 RX ORDER — CLINDAMYCIN HYDROCHLORIDE 300 MG/1
300 CAPSULE ORAL 3 TIMES DAILY
Qty: 30 CAPSULE | Refills: 0 | Status: SHIPPED | OUTPATIENT
Start: 2021-02-02 | End: 2021-02-12

## 2021-02-07 NOTE — ED PROVIDER NOTES
History:   Procedure Laterality Date    CHOLECYSTECTOMY  3-    CYSTOSCOPY  02/21/2020    with stent placement    CYSTOSCOPY Right 3/3/2020    CYSTOSCOPY URETEROSCOPY LASER-HLL performed by Scott King MD at [address] / [facility]  / [place] [place] Right 2/21/2020    CYSTOSCOPY  STENT INSERTION performed by Scott King MD at [address] / [address] / [place] Right 3/3/2020    CYSTOSCOPY RETROGRADE PYELOGRAM STENT INSERTION performed by Scott King MD at [facility] LITHOTRIPSY Right 03/03/2020    HLL; CYSTOSCOPY RETROGRADE PYELOGRAM STENT INSERTION     TUBAL LIGATION           CURRENT MEDICATIONS       Discharge Medication List as of 2/2/2021 12:00 PM      CONTINUE these medications which have NOT CHANGED    Details   ibuprofen (ADVIL;MOTRIN) 800 MG tablet Take 800 mg by mouth every 6 hours as needed for PainHistorical Med      Pseudoeph-Doxylamine-DM-APAP (NYQUIL PO) Take by mouthHistorical Med             ALLERGIES     Pcn [penicillins]    FAMILY HISTORY       Family History   Problem Relation Age of Onset    Diabetes Maternal Grandfather     Heart Disease Maternal Grandfather           SOCIAL HISTORY       Social History     Socioeconomic History    Marital status: Single     Spouse name: None    Number of children: None    Years of education: None    Highest education level: None   Occupational History    None   Social Needs    Financial resource strain: None    Food insecurity     Worry: None     Inability: None    Transportation needs     Medical: None     Non-medical: None   Tobacco Use    Smoking status: Current Every Day Smoker     Packs/day: 0.50     Years: 15.00     Pack years: 7.50    Smokeless tobacco: Never Used   Substance and Sexual Activity    Alcohol use:  Yes     Alcohol/week: 6.0 standard drinks     Types: 6 Cans of beer per week     Comment:  weekends occassionally    Drug use: No    Sexual activity: None Lifestyle    Physical activity     Days per week: None     Minutes per session: None    Stress: None   Relationships    Social connections     Talks on phone: None     Gets together: None     Attends Hindu service: None     Active member of club or organization: None     Attends meetings of clubs or organizations: None     Relationship status: None    Intimate partner violence     Fear of current or ex partner: None     Emotionally abused: None     Physically abused: None     Forced sexual activity: None   Other Topics Concern    None   Social History Narrative    None       SCREENINGS                PHYSICAL EXAM    (up to 7 for level 4, 8 or more for level 5)     ED Triage Vitals [02/02/21 1128]   BP Temp Temp Source Pulse Resp SpO2 Height Weight   124/78 97.4 °F (36.3 °C) Tympanic 68 16 99 % -- 130 lb (59 kg)     Constitutional: Oriented and well-developed, well-nourished, and in no distress. Non-toxic appearance. HEENT:   Head: Normocephalic and atraumatic. Mouth/Throat: The mucous membranes are pink and moist. No tonsillar and/or oropharyngeal hypertrophy exudates or redness. Face is asymmetric. There is mild surrounding gingival erythema and edema at the affected tooth. Eyes: Extra ocular muscles intact. . Pupils are equal, round, and reactive to light. No discharge. No scleral icterus. Neck: Normal range of motion and phonation normal. Neck supple. Musculoskeletal: Normal range of motion. No edema and no tenderness. Neurological: Alert and oriented. Skin: Skin is warm and dry. No rash noted. No cyanosis or erythema. No pallor. Nails show no clubbing.        DIAGNOSTIC RESULTS     EKG: All EKG's are interpreted by the Emergency Department Physician who either signs or Co-signs this chart in the absence of a cardiologist.      RADIOLOGY:   Non-plain film images such as CT, Ultrasound and MRI are read by the radiologist. Plain radiographic images are visualized and preliminarily interpreted by the emergency physician with the below findings:    Interpretation per the Radiologist below, if available at the time of this note:    No orders to display         ED BEDSIDE ULTRASOUND:   Performed by ED Physician - none    LABS:  No results found for this visit on 02/02/21. No orders of the defined types were placed in this encounter. All other labs were within normal range or not returned as of this dictation. EMERGENCY DEPARTMENT COURSE and DIFFERENTIAL DIAGNOSIS/MDM:   Vitals:    Vitals:    02/02/21 1128 02/02/21 1205   BP: 124/78    Pulse: 68    Resp: 16 16   Temp: 97.4 °F (36.3 °C)    TempSrc: Tympanic    SpO2: 99%    Weight: 130 lb (59 kg)        MEDICAL DECISION MAKING:      The patient was evaluated during the global COVID-19 pandemic, and that diagnosis was considered upon their initial presentation. Their evaluation, treatment and testing was consistent with current guidelines for patients who present with complaints or symptoms that may be related to COVID-19. Full PPE including gloves, gown, N95 or P100 respirator, and eye protection was used during every encounter with this patient. FINAL IMPRESSION      1. Dental abscess Stable         DISPOSITION/PLAN   DISPOSITION Decision To Discharge 02/02/2021 11:55:05 AM      PATIENT REFERRED TO:  [facility]  [address]  [address] [place] [address]  [phone]  Schedule an appointment as soon as possible for a visit in 3 days        DISCHARGE MEDICATIONS:  Discharge Medication List as of 2/2/2021 12:00 PM      START taking these medications    Details   clindamycin (CLEOCIN) 300 MG capsule Take 1 capsule by mouth 3 times daily for 10 days, Disp-30 capsule, R-0Normal           Controlled Substances Monitoring:     No flowsheet data found.     (Please note that portions of this note were completed with a voice recognition program.  Efforts were made to edit the dictations but occasionally words are mis-transcribed.)    Electronically signed by PEPE Ramírez CNP on 2/6/2021 at 7:14 PM       PEPE Ramírez CNP  02/06/21 1914

## 2024-07-21 ENCOUNTER — HOSPITAL ENCOUNTER (EMERGENCY)
Age: 44
Discharge: HOME | End: 2024-07-22
Attending: EMERGENCY MEDICINE
Payer: COMMERCIAL

## 2024-07-21 DIAGNOSIS — R21 RASH: Primary | ICD-10-CM

## 2024-07-21 LAB
ALBUMIN SERPL-MCNC: 4.2 G/DL (ref 3.5–5.2)
ALBUMIN/GLOB SERPL: 1.3 {RATIO} (ref 1–2.5)
ALP SERPL-CCNC: 87 U/L (ref 35–104)
ALT SERPL-CCNC: 14 U/L (ref 5–33)
ANION GAP SERPL CALCULATED.3IONS-SCNC: 13 MMOL/L (ref 9–17)
AST SERPL-CCNC: 13 U/L
BASOPHILS # BLD: 0.07 K/UL (ref 0–0.2)
BASOPHILS NFR BLD: 1 % (ref 0–2)
BILIRUB SERPL-MCNC: 0.4 MG/DL (ref 0.3–1.2)
BUN SERPL-MCNC: 5 MG/DL (ref 6–20)
BUN/CREAT SERPL: 7 (ref 9–20)
CALCIUM SERPL-MCNC: 9.1 MG/DL (ref 8.6–10.4)
CHLORIDE SERPL-SCNC: 102 MMOL/L (ref 98–107)
CO2 SERPL-SCNC: 21 MMOL/L (ref 20–31)
CREAT SERPL-MCNC: 0.7 MG/DL (ref 0.5–0.9)
EOSINOPHIL # BLD: 0.13 K/UL (ref 0–0.44)
EOSINOPHILS RELATIVE PERCENT: 1 % (ref 1–4)
ERYTHROCYTE [DISTWIDTH] IN BLOOD BY AUTOMATED COUNT: 12.8 % (ref 11.8–14.4)
GFR, ESTIMATED: >90 ML/MIN/1.73M2
GLUCOSE SERPL-MCNC: 91 MG/DL (ref 70–99)
HCT VFR BLD AUTO: 45.3 % (ref 36.3–47.1)
HGB BLD-MCNC: 15.6 G/DL (ref 11.9–15.1)
IMM GRANULOCYTES # BLD AUTO: 0.04 K/UL (ref 0–0.3)
IMM GRANULOCYTES NFR BLD: 0 %
LYMPHOCYTES NFR BLD: 3.35 K/UL (ref 1.1–3.7)
LYMPHOCYTES RELATIVE PERCENT: 29 % (ref 24–43)
MCH RBC QN AUTO: 31.6 PG (ref 25.2–33.5)
MCHC RBC AUTO-ENTMCNC: 34.4 G/DL (ref 28.4–34.8)
MCV RBC AUTO: 91.7 FL (ref 82.6–102.9)
MONOCYTES NFR BLD: 0.76 K/UL (ref 0.1–1.2)
MONOCYTES NFR BLD: 7 % (ref 3–12)
NEUTROPHILS NFR BLD: 62 % (ref 36–65)
NEUTS SEG NFR BLD: 7.24 K/UL (ref 1.5–8.1)
NRBC BLD-RTO: 0 PER 100 WBC
PLATELET # BLD AUTO: 311 K/UL (ref 138–453)
PMV BLD AUTO: 9.5 FL (ref 8.1–13.5)
POTASSIUM SERPL-SCNC: 3.7 MMOL/L (ref 3.7–5.3)
PROT SERPL-MCNC: 7.5 G/DL (ref 6.4–8.3)
RBC # BLD AUTO: 4.94 M/UL (ref 3.95–5.11)
SODIUM SERPL-SCNC: 136 MMOL/L (ref 135–144)
WBC OTHER # BLD: 11.6 K/UL (ref 3.5–11.3)

## 2024-07-21 PROCEDURE — 2580000003 HC RX 258: Performed by: EMERGENCY MEDICINE

## 2024-07-21 PROCEDURE — 85025 COMPLETE CBC W/AUTO DIFF WBC: CPT

## 2024-07-21 PROCEDURE — 96374 THER/PROPH/DIAG INJ IV PUSH: CPT

## 2024-07-21 PROCEDURE — 80053 COMPREHEN METABOLIC PANEL: CPT

## 2024-07-21 PROCEDURE — 6360000002 HC RX W HCPCS: Performed by: EMERGENCY MEDICINE

## 2024-07-21 PROCEDURE — 87798 DETECT AGENT NOS DNA AMP: CPT

## 2024-07-21 PROCEDURE — 99284 EMERGENCY DEPT VISIT MOD MDM: CPT

## 2024-07-21 RX ORDER — DIPHENHYDRAMINE HYDROCHLORIDE 50 MG/ML
25 INJECTION INTRAMUSCULAR; INTRAVENOUS ONCE
Status: COMPLETED | OUTPATIENT
Start: 2024-07-21 | End: 2024-07-21

## 2024-07-21 RX ORDER — 0.9 % SODIUM CHLORIDE 0.9 %
1000 INTRAVENOUS SOLUTION INTRAVENOUS ONCE
Status: COMPLETED | OUTPATIENT
Start: 2024-07-21 | End: 2024-07-22

## 2024-07-21 RX ADMIN — SODIUM CHLORIDE 1000 ML: 9 INJECTION, SOLUTION INTRAVENOUS at 23:34

## 2024-07-21 RX ADMIN — DIPHENHYDRAMINE HYDROCHLORIDE 25 MG: 50 INJECTION INTRAMUSCULAR; INTRAVENOUS at 23:34

## 2024-07-21 ASSESSMENT — LIFESTYLE VARIABLES
HOW MANY STANDARD DRINKS CONTAINING ALCOHOL DO YOU HAVE ON A TYPICAL DAY: 5 OR 6
HOW OFTEN DO YOU HAVE A DRINK CONTAINING ALCOHOL: 2-4 TIMES A MONTH

## 2024-07-21 ASSESSMENT — PAIN - FUNCTIONAL ASSESSMENT: PAIN_FUNCTIONAL_ASSESSMENT: NONE - DENIES PAIN

## 2024-07-22 VITALS
TEMPERATURE: 98.7 F | BODY MASS INDEX: 25.76 KG/M2 | HEART RATE: 91 BPM | OXYGEN SATURATION: 99 % | DIASTOLIC BLOOD PRESSURE: 81 MMHG | WEIGHT: 140 LBS | SYSTOLIC BLOOD PRESSURE: 131 MMHG | HEIGHT: 62 IN | RESPIRATION RATE: 17 BRPM

## 2024-07-22 PROCEDURE — 6370000000 HC RX 637 (ALT 250 FOR IP): Performed by: EMERGENCY MEDICINE

## 2024-07-22 RX ORDER — CLOTRIMAZOLE 1 %
CREAM (GRAM) TOPICAL
Qty: 60 G | Refills: 1 | Status: SHIPPED | OUTPATIENT
Start: 2024-07-22 | End: 2024-07-29

## 2024-07-22 RX ORDER — HYDROXYZINE HYDROCHLORIDE 25 MG/1
25 TABLET, FILM COATED ORAL ONCE
Status: COMPLETED | OUTPATIENT
Start: 2024-07-22 | End: 2024-07-22

## 2024-07-22 RX ORDER — HYDROXYZINE HYDROCHLORIDE 25 MG/1
25 TABLET, FILM COATED ORAL EVERY 8 HOURS PRN
Qty: 30 TABLET | Refills: 0 | Status: SHIPPED | OUTPATIENT
Start: 2024-07-22 | End: 2024-08-01

## 2024-07-22 RX ADMIN — HYDROXYZINE HYDROCHLORIDE 25 MG: 25 TABLET, FILM COATED ORAL at 02:43

## 2024-07-22 NOTE — DISCHARGE INSTRUCTIONS
Continues on twice daily for 14 days.  Take Atarax every 6-8 hours as needed for itching.  Keep rash clean with soap and water.  Please return on Wednesday, 7/24/2024 between 7 AM and 7 PM so that I can reevaluate your rash.  Also look out for final lab results.  Please seek medical attention immediately should you develop any fevers chills or any other acute concerns

## 2024-07-22 NOTE — ED PROVIDER NOTES
[facility] ED  EMERGENCY DEPARTMENT ENCOUNTER      Pt Name: Sofía Mendes  MRN: [id number]  Birthdate 1980  Date of evaluation: 7/21/2024  Provider: Anita George MD    CHIEF COMPLAINT       Chief Complaint   Patient presents with    Rash     Pt to ED from home with c/o rash.  Onset of symptoms x2-3 days.  Pt reports completing yard work and experiencing symptoms.  Pt has taken steroid cream and Benadryl at hs.         HISTORY OF PRESENT ILLNESS   (Location/Symptom, Timing/Onset, Context/Setting, Quality, Duration, Modifying Factors, Severity)  Note limiting factors.   Sofía Mendes is a 43 y.o. female who presents to the emergency department ***     HPI    Nursing Notes were reviewed.    REVIEW OF SYSTEMS    (2-9 systems for level 4, 10 or more for level 5)     Review of Systems    Except as noted above the remainder of the review of systems was reviewed and negative.       PAST MEDICAL HISTORY     Past Medical History:   Diagnosis Date    Deaf          SURGICAL HISTORY       Past Surgical History:   Procedure Laterality Date    CHOLECYSTECTOMY  3-    CYSTOSCOPY  02/21/2020    with stent placement    CYSTOSCOPY Right 3/3/2020    CYSTOSCOPY URETEROSCOPY LASER-HLL performed by Tyrell Ayala MD at [facility] OR    CYSTOSCOPY INSERTION / REMOVAL STENT / STONE Right 2/21/2020    CYSTOSCOPY  STENT INSERTION performed by Tyrell Ayala MD at [facility] OR    CYSTOSCOPY INSERTION / REMOVAL STENT / STONE Right 3/3/2020    CYSTOSCOPY RETROGRADE PYELOGRAM STENT INSERTION performed by Tyrell Ayala MD at [facility] OR    LITHOTRIPSY Right 03/03/2020    HLL; CYSTOSCOPY RETROGRADE PYELOGRAM STENT INSERTION     TUBAL LIGATION           CURRENT MEDICATIONS       Previous Medications    IBUPROFEN (ADVIL;MOTRIN) 800 MG TABLET    Take 800 mg by mouth every 6 hours as needed for Pain    PSEUDOEPH-DOXYLAMINE-DM-APAP (NYQUIL PO)    Take by mouth       ALLERGIES     Pcn [penicillins]    FAMILY HISTORY       Family  Normal breath sounds.   Skin:     Comments: Papulovesicular lesions scattered on feet, extremities and trunk.  Singular lesions, no lies or patches.   Neurological:      General: No focal deficit present.      Mental Status: She is alert and oriented to person, place, and time.   Psychiatric:         Mood and Affect: Mood normal.         DIAGNOSTIC RESULTS     EKG: All EKG's are interpreted by the Emergency Department Physician who either signs or Co-signs this chart in the absence of a cardiologist.        RADIOLOGY:   Non-plain film images such as CT, Ultrasound and MRI are read by the radiologist. Plain radiographic images are visualized and preliminarily interpreted by the emergency physician with the below findings:        Interpretation per the Radiologist below, if available at the time of this note:    No orders to display         ED BEDSIDE ULTRASOUND:   Performed by ED Physician - none    LABS:  Labs Reviewed   CBC WITH AUTO DIFFERENTIAL - Abnormal; Notable for the following components:       Result Value    WBC 11.6 (*)     Hemoglobin 15.6 (*)     All other components within normal limits   COMPREHENSIVE METABOLIC PANEL - Abnormal; Notable for the following components:    BUN 5 (*)     BUN/Creatinine Ratio 7 (*)     All other components within normal limits   VARICELLA ZOSTER, MOLECULAR       All other labs were within normal range or not returned as of this dictation.    EMERGENCY DEPARTMENT COURSE and DIFFERENTIAL DIAGNOSIS/MDM:   Vitals:    Vitals:    07/21/24 2125 07/22/24 0230   BP: (!) 147/84 131/81   Pulse: (!) 117 91   Resp: 20 17   Temp: 98.7 °F (37.1 °C)    TempSrc: Oral    SpO2: 98% 99%   Weight: 63.5 kg (140 lb)    Height: 1.575 m (5' 2\")            MDM  Number of Diagnoses or Management Options  Rash  Diagnosis management comments: Due to no history of previous similar rash or atopic dermatitis and scattered singular lesions, varicella testing sent.  Will hold off on steroids until this is

## 2024-07-23 LAB
SPECIMEN DESCRIPTION: NORMAL
VZV DNA SPEC QL NAA+PROBE: NEGATIVE
VZV DNA SPEC QL NAA+PROBE: NORMAL

## 2024-07-24 RX ORDER — PREDNISONE 5 MG/1
TABLET ORAL
Qty: 10 TABLET | Refills: 0 | Status: SHIPPED | OUTPATIENT
Start: 2024-08-05 | End: 2024-08-08

## 2024-07-24 RX ORDER — PREDNISONE 10 MG/1
TABLET ORAL
Qty: 10 TABLET | Refills: 0 | Status: SHIPPED | OUTPATIENT
Start: 2024-08-01 | End: 2024-08-04

## 2024-07-24 RX ORDER — PREDNISONE 20 MG/1
TABLET ORAL
Qty: 12 TABLET | Refills: 0 | Status: SHIPPED | OUTPATIENT
Start: 2024-07-24 | End: 2024-07-31

## 2024-08-29 ENCOUNTER — APPOINTMENT (OUTPATIENT)
Dept: GENERAL RADIOLOGY | Age: 44
End: 2024-08-29
Payer: COMMERCIAL

## 2024-08-29 ENCOUNTER — HOSPITAL ENCOUNTER (EMERGENCY)
Age: 44
Discharge: HOME | End: 2024-08-29
Payer: COMMERCIAL

## 2024-08-29 VITALS
TEMPERATURE: 98.2 F | RESPIRATION RATE: 16 BRPM | SYSTOLIC BLOOD PRESSURE: 130 MMHG | HEART RATE: 94 BPM | DIASTOLIC BLOOD PRESSURE: 89 MMHG | OXYGEN SATURATION: 97 %

## 2024-08-29 DIAGNOSIS — L30.9 DERMATITIS: Primary | ICD-10-CM

## 2024-08-29 LAB
ALBUMIN SERPL-MCNC: 4.2 G/DL (ref 3.5–5.2)
ALBUMIN/GLOB SERPL: 1.6 {RATIO} (ref 1–2.5)
ALP SERPL-CCNC: 98 U/L (ref 35–104)
ALT SERPL-CCNC: 17 U/L (ref 10–35)
ANION GAP SERPL CALCULATED.3IONS-SCNC: 11 MMOL/L (ref 9–16)
AST SERPL-CCNC: 18 U/L (ref 10–35)
BASOPHILS # BLD: 0.05 K/UL (ref 0–0.2)
BASOPHILS NFR BLD: 0 % (ref 0–2)
BILIRUB SERPL-MCNC: <0.2 MG/DL (ref 0–1.2)
BUN SERPL-MCNC: 14 MG/DL (ref 6–20)
BUN/CREAT SERPL: 13 (ref 9–20)
CALCIUM SERPL-MCNC: 9.2 MG/DL (ref 8.6–10.4)
CHLORIDE SERPL-SCNC: 101 MMOL/L (ref 98–107)
CO2 SERPL-SCNC: 22 MMOL/L (ref 20–31)
CREAT SERPL-MCNC: 1.1 MG/DL (ref 0.5–0.9)
CRP SERPL HS-MCNC: <3 MG/L (ref 0–5)
EOSINOPHIL # BLD: 0.23 K/UL (ref 0–0.44)
EOSINOPHILS RELATIVE PERCENT: 2 % (ref 1–4)
ERYTHROCYTE [DISTWIDTH] IN BLOOD BY AUTOMATED COUNT: 12.3 % (ref 11.8–14.4)
GFR, ESTIMATED: 67 ML/MIN/1.73M2
GLUCOSE SERPL-MCNC: 94 MG/DL (ref 74–99)
HCT VFR BLD AUTO: 42.1 % (ref 36.3–47.1)
HGB BLD-MCNC: 14.1 G/DL (ref 11.9–15.1)
IMM GRANULOCYTES # BLD AUTO: 0.04 K/UL (ref 0–0.3)
IMM GRANULOCYTES NFR BLD: 0 %
LYMPHOCYTES NFR BLD: 3.08 K/UL (ref 1.1–3.7)
LYMPHOCYTES RELATIVE PERCENT: 26 % (ref 24–43)
MAGNESIUM SERPL-MCNC: 2.1 MG/DL (ref 1.6–2.6)
MCH RBC QN AUTO: 31.3 PG (ref 25.2–33.5)
MCHC RBC AUTO-ENTMCNC: 33.5 G/DL (ref 28.4–34.8)
MCV RBC AUTO: 93.3 FL (ref 82.6–102.9)
MONOCYTES NFR BLD: 0.74 K/UL (ref 0.1–1.2)
MONOCYTES NFR BLD: 6 % (ref 3–12)
NEUTROPHILS NFR BLD: 66 % (ref 36–65)
NEUTS SEG NFR BLD: 7.51 K/UL (ref 1.5–8.1)
NRBC BLD-RTO: 0 PER 100 WBC
PLATELET # BLD AUTO: 327 K/UL (ref 138–453)
PMV BLD AUTO: 9.5 FL (ref 8.1–13.5)
POTASSIUM SERPL-SCNC: 3.7 MMOL/L (ref 3.7–5.3)
PROT SERPL-MCNC: 6.8 G/DL (ref 6.6–8.7)
RBC # BLD AUTO: 4.51 M/UL (ref 3.95–5.11)
SODIUM SERPL-SCNC: 134 MMOL/L (ref 136–145)
WBC OTHER # BLD: 11.7 K/UL (ref 3.5–11.3)

## 2024-08-29 PROCEDURE — 86618 LYME DISEASE ANTIBODY: CPT

## 2024-08-29 PROCEDURE — 99284 EMERGENCY DEPT VISIT MOD MDM: CPT

## 2024-08-29 PROCEDURE — 71046 X-RAY EXAM CHEST 2 VIEWS: CPT

## 2024-08-29 PROCEDURE — 83735 ASSAY OF MAGNESIUM: CPT

## 2024-08-29 PROCEDURE — 85025 COMPLETE CBC W/AUTO DIFF WBC: CPT

## 2024-08-29 PROCEDURE — 86038 ANTINUCLEAR ANTIBODIES: CPT

## 2024-08-29 PROCEDURE — 86738 MYCOPLASMA ANTIBODY: CPT

## 2024-08-29 PROCEDURE — 6370000000 HC RX 637 (ALT 250 FOR IP): Performed by: NURSE PRACTITIONER

## 2024-08-29 PROCEDURE — 86225 DNA ANTIBODY NATIVE: CPT

## 2024-08-29 PROCEDURE — 86593 SYPHILIS TEST NON-TREP QUANT: CPT

## 2024-08-29 PROCEDURE — 80053 COMPREHEN METABOLIC PANEL: CPT

## 2024-08-29 PROCEDURE — 86140 C-REACTIVE PROTEIN: CPT

## 2024-08-29 RX ORDER — ACYCLOVIR 200 MG/1
800 CAPSULE ORAL ONCE
Status: COMPLETED | OUTPATIENT
Start: 2024-08-29 | End: 2024-08-29

## 2024-08-29 RX ORDER — DOXYCYCLINE HYCLATE 100 MG
100 TABLET ORAL 2 TIMES DAILY
Qty: 20 TABLET | Refills: 0 | Status: SHIPPED | OUTPATIENT
Start: 2024-08-29 | End: 2024-09-08

## 2024-08-29 RX ORDER — ACYCLOVIR 800 MG/1
800 TABLET ORAL 3 TIMES DAILY
Qty: 30 TABLET | Refills: 0 | Status: SHIPPED | OUTPATIENT
Start: 2024-08-29 | End: 2024-09-08

## 2024-08-29 RX ORDER — CETIRIZINE HYDROCHLORIDE 10 MG/1
10 TABLET ORAL DAILY
Qty: 30 TABLET | Refills: 0 | Status: SHIPPED | OUTPATIENT
Start: 2024-08-29

## 2024-08-29 RX ORDER — DOXYCYCLINE 100 MG/1
100 CAPSULE ORAL ONCE
Status: COMPLETED | OUTPATIENT
Start: 2024-08-29 | End: 2024-08-29

## 2024-08-29 RX ADMIN — DOXYCYCLINE HYCLATE 100 MG: 100 CAPSULE ORAL at 15:50

## 2024-08-29 RX ADMIN — ACYCLOVIR 800 MG: 200 CAPSULE ORAL at 15:51

## 2024-08-29 NOTE — DISCHARGE INSTRUCTIONS
Doxycycline 100 mg 1 by mouth twice daily x 10 days until results back. If fine red rash develops, discontinue thismedicaiton, continue cetirizine and let us know re reaction.  Acyclovir 800 mg 1 by mouth 3 times daily until results back  Cetirizine 10 mg 1 by mouth x 14 days.     Discontinue use of topical steroid    Ruling out mycoplasma pneumonia rash, erythema multiform, lyme disease

## 2024-08-29 NOTE — ED PROVIDER NOTES
[facility] ED  EMERGENCY DEPARTMENT ENCOUNTER      Pt Name: Sofía Mendes  MRN: [id number]  Birthdate 1980  Date of evaluation: 8/29/2024  Provider: PEPE Contreras - CNP  11:37 AM    CHIEF COMPLAINT       Chief Complaint   Patient presents with    Rash     Patient was seen July 21st for the same rash. Was prescribed prednisone and a steroid cream. No relief or improvement.         HISTORY OF PRESENT ILLNESS        Sofía Mendes 43-year-old female presents from home to ED with complaint of rash. Patient was seen July 21st for the same rash. Was prescribed prednisone and a steroid cream. No relief or improvement-rash worsening. 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      PMH: Deaf        The history is provided by the patient. No  was used.       Nursing Notes were reviewed.    REVIEW OF SYSTEMS       Review of Systems   Constitutional:  Negative for fever.   Respiratory:  Negative for cough.    Skin:  Positive for rash.   All other systems reviewed and are negative.      Except as noted above the remainder of the review of systems was reviewed and negative.       PAST MEDICAL HISTORY     Past Medical History:   Diagnosis Date    Deaf          SURGICAL HISTORY       Past Surgical History:   Procedure Laterality Date    CHOLECYSTECTOMY  3-    CYSTOSCOPY  02/21/2020    with stent placement    CYSTOSCOPY Right 3/3/2020    CYSTOSCOPY URETEROSCOPY LASER-HLL performed by Tyrell Ayala MD at [facility] OR    CYSTOSCOPY INSERTION / REMOVAL STENT / STONE Right 2/21/2020    CYSTOSCOPY  STENT INSERTION performed by Tyrell Ayala MD at [facility] OR    CYSTOSCOPY INSERTION / REMOVAL STENT / STONE Right 3/3/2020    CYSTOSCOPY RETROGRADE PYELOGRAM STENT INSERTION performed by Tyrell Ayala MD at [facility] OR    LITHOTRIPSY Right 03/03/2020    HLL; CYSTOSCOPY RETROGRADE PYELOGRAM STENT INSERTION     TUBAL LIGATION           CURRENT MEDICATIONS       Discharge Medication  No excoriation or secondary infection.No drainage. No infestation.Areas do not lulu. Pruritic.    Neurological:      General: No focal deficit present.      Mental Status: She is alert and oriented to person, place, and time.      Sensory: No sensory deficit.   Psychiatric:         Mood and Affect: Mood normal.         Behavior: Behavior normal.         Thought Content: Thought content normal.         Judgment: Judgment normal.                               DIAGNOSTIC RESULTS     EKG: All EKG's are interpreted by the Emergency Department Physician who either signs or Co-signs this chart in the absence of a cardiologist.        RADIOLOGY:   Non-plain film images such as CT, Ultrasound and MRI are read by the radiologist. Plain radiographic images are visualized and preliminarily interpreted by the emergency physician with the below findings:        Interpretation per the Radiologist below, if available at the time of this note:    XR CHEST (2 VW)   Final Result   No acute cardiopulmonary abnormality.               ED BEDSIDE ULTRASOUND:   Performed by ED Physician - none    LABS:  Labs Reviewed   CBC WITH AUTO DIFFERENTIAL - Abnormal; Notable for the following components:       Result Value    WBC 11.7 (*)     Neutrophils % 66 (*)     All other components within normal limits   COMPREHENSIVE METABOLIC PANEL - Abnormal; Notable for the following components:    Sodium 134 (*)     Creatinine 1.1 (*)     All other components within normal limits   MYCOPLASMA PNEUMONIAE ANTIBODY, IGM - Abnormal; Notable for the following components:    Mycoplasma pneumo IgM 1.16 (*)     All other components within normal limits   MYCOPLASMA PNEUMONIAE ANTIBODY, IGG - Abnormal; Notable for the following components:    Mycoplasma pneumo IgG 2.26 (*)     All other components within normal limits   MAGNESIUM   C-REACTIVE PROTEIN   VDRL, QUANTITATIVE   ZHAO SCREEN WITH REFLEX   LYME DISEASE REFLEX PANEL (TIER 1)       All other labs were

## 2024-08-30 LAB
ANA SER QL IA: NEGATIVE
DSDNA IGG SER QL IA: <0.5 IU/ML
M PNEUMO IGG SER QL IA: 2.26
M PNEUMO IGM SER QL IA: 1.16
NUCLEAR IGG SER IA-RTO: 0.2 U/ML
VDRL SER-TITR: NONREACTIVE {TITER}

## 2024-08-30 ASSESSMENT — ENCOUNTER SYMPTOMS: COUGH: 0

## 2024-08-31 LAB — B BURGDOR AB SER IA-ACNC: 0.32 IV

## 2024-09-20 ENCOUNTER — HOSPITAL ENCOUNTER (OUTPATIENT)
Age: 44
Discharge: HOME | End: 2024-09-20
Payer: COMMERCIAL

## 2024-09-20 LAB
ALT SERPL-CCNC: 20 U/L (ref 10–35)
AST SERPL-CCNC: 17 U/L (ref 10–35)
BASOPHILS # BLD: 0.05 K/UL (ref 0–0.2)
BASOPHILS NFR BLD: 0 % (ref 0–2)
EOSINOPHIL # BLD: 0.19 K/UL (ref 0–0.44)
EOSINOPHILS RELATIVE PERCENT: 2 % (ref 1–4)
ERYTHROCYTE [DISTWIDTH] IN BLOOD BY AUTOMATED COUNT: 12.6 % (ref 11.8–14.4)
HCT VFR BLD AUTO: 44.4 % (ref 36.3–47.1)
HGB BLD-MCNC: 15.1 G/DL (ref 11.9–15.1)
IMM GRANULOCYTES # BLD AUTO: 0.05 K/UL (ref 0–0.3)
IMM GRANULOCYTES NFR BLD: 0 %
LYMPHOCYTES NFR BLD: 2.49 K/UL (ref 1.1–3.7)
LYMPHOCYTES RELATIVE PERCENT: 20 % (ref 24–43)
MCH RBC QN AUTO: 31.2 PG (ref 25.2–33.5)
MCHC RBC AUTO-ENTMCNC: 34 G/DL (ref 28.4–34.8)
MCV RBC AUTO: 91.7 FL (ref 82.6–102.9)
MONOCYTES NFR BLD: 0.79 K/UL (ref 0.1–1.2)
MONOCYTES NFR BLD: 6 % (ref 3–12)
NEUTROPHILS NFR BLD: 72 % (ref 36–65)
NEUTS SEG NFR BLD: 8.82 K/UL (ref 1.5–8.1)
NRBC BLD-RTO: 0 PER 100 WBC
PLATELET # BLD AUTO: 345 K/UL (ref 138–453)
PMV BLD AUTO: 9.5 FL (ref 8.1–13.5)
RBC # BLD AUTO: 4.84 M/UL (ref 3.95–5.11)
WBC OTHER # BLD: 12.4 K/UL (ref 3.5–11.3)

## 2024-09-20 PROCEDURE — 84460 ALANINE AMINO (ALT) (SGPT): CPT

## 2024-09-20 PROCEDURE — 86803 HEPATITIS C AB TEST: CPT

## 2024-09-20 PROCEDURE — 84450 TRANSFERASE (AST) (SGOT): CPT

## 2024-09-20 PROCEDURE — 36415 COLL VENOUS BLD VENIPUNCTURE: CPT

## 2024-09-20 PROCEDURE — 87536 HIV-1 QUANT&REVRSE TRNSCRPJ: CPT

## 2024-09-20 PROCEDURE — 86480 TB TEST CELL IMMUN MEASURE: CPT

## 2024-09-20 PROCEDURE — 85025 COMPLETE CBC W/AUTO DIFF WBC: CPT

## 2024-09-20 PROCEDURE — 87340 HEPATITIS B SURFACE AG IA: CPT

## 2024-09-21 LAB
HBV SURFACE AG SERPL QL IA: NONREACTIVE
HCV AB SERPL QL IA: NONREACTIVE

## 2024-09-23 LAB
HIV1 RNA # SERPL NAA+PROBE: NOT DETECTED {COPIES}/ML
SPECIMEN SOURCE: NORMAL

## 2024-09-24 LAB
QUANTI TB GOLD PLUS: NEGATIVE
QUANTI TB1 MINUS NIL: 0 IU/ML
QUANTI TB2 MINUS NIL: 0.01 IU/ML
QUANTIFERON MITOGEN: 10 IU/ML
QUANTIFERON NIL: 0 IU/ML

## 2024-12-19 ENCOUNTER — HOSPITAL ENCOUNTER (EMERGENCY)
Age: 44
Discharge: HOME | End: 2024-12-19
Attending: EMERGENCY MEDICINE
Payer: COMMERCIAL

## 2024-12-19 VITALS
HEART RATE: 105 BPM | WEIGHT: 130 LBS | BODY MASS INDEX: 23.78 KG/M2 | TEMPERATURE: 98.4 F | DIASTOLIC BLOOD PRESSURE: 93 MMHG | RESPIRATION RATE: 18 BRPM | SYSTOLIC BLOOD PRESSURE: 140 MMHG | OXYGEN SATURATION: 98 %

## 2024-12-19 DIAGNOSIS — Z20.2 STD EXPOSURE: Primary | ICD-10-CM

## 2024-12-19 LAB
BILIRUB UR QL STRIP: NEGATIVE
CLARITY UR: CLEAR
COLOR UR: YELLOW
EPI CELLS #/AREA URNS HPF: ABNORMAL /HPF (ref 0–25)
GLUCOSE UR STRIP-MCNC: NEGATIVE MG/DL
HCG UR QL: NEGATIVE
HGB UR QL STRIP.AUTO: ABNORMAL
KETONES UR STRIP-MCNC: NEGATIVE MG/DL
LEUKOCYTE ESTERASE UR QL STRIP: NEGATIVE
MICROORGANISM/AGENT SPEC: ABNORMAL
MUCOUS THREADS URNS QL MICRO: ABNORMAL
NITRITE UR QL STRIP: NEGATIVE
PH UR STRIP: 6 [PH] (ref 5–9)
PROT UR STRIP-MCNC: NEGATIVE MG/DL
RBC #/AREA URNS HPF: ABNORMAL /HPF (ref 0–2)
SP GR UR STRIP: 1.01 (ref 1.01–1.02)
SPECIMEN DESCRIPTION: ABNORMAL
UROBILINOGEN UR STRIP-ACNC: NORMAL EU/DL (ref 0–1)
WBC #/AREA URNS HPF: ABNORMAL /HPF (ref 0–5)

## 2024-12-19 PROCEDURE — 99284 EMERGENCY DEPT VISIT MOD MDM: CPT

## 2024-12-19 PROCEDURE — 96372 THER/PROPH/DIAG INJ SC/IM: CPT

## 2024-12-19 PROCEDURE — 87210 SMEAR WET MOUNT SALINE/INK: CPT

## 2024-12-19 PROCEDURE — 87591 N.GONORRHOEAE DNA AMP PROB: CPT

## 2024-12-19 PROCEDURE — 87491 CHLMYD TRACH DNA AMP PROBE: CPT

## 2024-12-19 PROCEDURE — 6360000002 HC RX W HCPCS: Performed by: PHYSICIAN ASSISTANT

## 2024-12-19 PROCEDURE — 81025 URINE PREGNANCY TEST: CPT

## 2024-12-19 PROCEDURE — 81001 URINALYSIS AUTO W/SCOPE: CPT

## 2024-12-19 RX ORDER — DOXYCYCLINE 100 MG/1
100 CAPSULE ORAL 2 TIMES DAILY
Qty: 20 CAPSULE | Refills: 0 | Status: SHIPPED | OUTPATIENT
Start: 2024-12-19 | End: 2024-12-29

## 2024-12-19 RX ORDER — METRONIDAZOLE 500 MG/1
500 TABLET ORAL 3 TIMES DAILY
Qty: 30 TABLET | Refills: 0 | Status: SHIPPED | OUTPATIENT
Start: 2024-12-19 | End: 2024-12-29

## 2024-12-19 RX ORDER — ADALIMUMAB 4 MG/ML
1 KIT INJECTION SEE ADMIN INSTRUCTIONS
COMMUNITY
Start: 2024-10-08

## 2024-12-19 RX ADMIN — LIDOCAINE HYDROCHLORIDE 1000 MG: 10 INJECTION, SOLUTION EPIDURAL; INFILTRATION; INTRACAUDAL; PERINEURAL at 15:45

## 2024-12-19 ASSESSMENT — ENCOUNTER SYMPTOMS
SHORTNESS OF BREATH: 0
COUGH: 0

## 2024-12-19 NOTE — DISCHARGE INSTRUCTIONS
Antibiotics have been prescribed for you.  Abstain from sexual activity until you finish the treatment.

## 2024-12-19 NOTE — ED PROVIDER NOTES
[facility]  EMERGENCY DEPARTMENT ENCOUNTER      Pt Name: Sofía Mendes  MRN: [id number]  Birthdate 1980  Date of evaluation: 12/19/2024  Provider: Melani Ortiz PA-C    CHIEF COMPLAINT       Chief Complaint   Patient presents with    Exposure to STD     Patient concerned for STI, recent testing of significant other was positive for trichomoniasis.         HISTORY OF PRESENT ILLNESS      Sofía Mendes is a 44 y.o. female who presents to the emergency department due to STI exposure.  Patient states that her significant other tested positive for trichomonas.  She has been having odor but no discharge or pain.  No other complaints or concerns.        REVIEW OF SYSTEMS       Review of Systems   Constitutional:  Negative for fever.   Respiratory:  Negative for cough and shortness of breath.    Cardiovascular:  Negative for chest pain.   Genitourinary:  Negative for pelvic pain, vaginal bleeding and vaginal discharge.         PAST MEDICAL HISTORY     Past Medical History:   Diagnosis Date    Deaf          SURGICAL HISTORY       Past Surgical History:   Procedure Laterality Date    CHOLECYSTECTOMY  3-    CYSTOSCOPY  02/21/2020    with stent placement    CYSTOSCOPY Right 3/3/2020    CYSTOSCOPY URETEROSCOPY LASER-HLL performed by Tyrell Ayala MD at [facility] OR    CYSTOSCOPY INSERTION / REMOVAL STENT / STONE Right 2/21/2020    CYSTOSCOPY  STENT INSERTION performed by Tyrell Ayala MD at [facility] OR    CYSTOSCOPY INSERTION / REMOVAL STENT / STONE Right 3/3/2020    CYSTOSCOPY RETROGRADE PYELOGRAM STENT INSERTION performed by Tyrell yAala MD at [facility] OR    LITHOTRIPSY Right 03/03/2020    HLL; CYSTOSCOPY RETROGRADE PYELOGRAM STENT INSERTION     TUBAL LIGATION           CURRENT MEDICATIONS       Previous Medications    ADALIMUMAB (HUMIRA-PSORIASIS/UVEIT STARTER) 80 MG/0.8ML & 40MG/0.4ML AJKT    Inject 1 Pen into the skin See Admin Instructions    CETIRIZINE (ZYRTEC) 10 MG TABLET    Take 1

## 2024-12-19 NOTE — ED PROVIDER NOTES
eMERGENCY dEPARTMENT  Attending Physician Attestation     Pt Name: Sofía Mendes  MRN: [id number]  Birthdate 1980  Date of evaluation: 12/19/24     Sofía Mendes is a 44 y.o. female with CC: Exposure to STD (Patient concerned for STI, recent testing of significant other was positive for trichomoniasis.)      Based on the medical record the care appears appropriate.  I was personally available for consultation in the Emergency Department.    Daljit Beckman MD  Attending Emergency Physician                  Dajlit Beckman MD  12/19/24 9517

## 2024-12-20 LAB
CHLAMYDIA DNA UR QL NAA+PROBE: NEGATIVE
N GONORRHOEA DNA UR QL NAA+PROBE: NEGATIVE
SPECIMEN DESCRIPTION: NORMAL

## (undated) DEVICE — Device

## (undated) DEVICE — GLOVE ORANGE PI 7 1/2   MSG9075

## (undated) DEVICE — SPONGE GZ W4XL4IN CELOS POSTOP AVANT

## (undated) DEVICE — Z DISCONTINUED BY MEDLINE USE 2711682 TRAY SKIN PREP DRY W/ PREM GLV

## (undated) DEVICE — SOLUTION IV IRRIG WATER 1000ML POUR BRL 2F7114

## (undated) DEVICE — GUIDEWIRE VASC STR 3 CM 0.035 INX150 CM STD NIT ZIPWIRE

## (undated) DEVICE — SOLUTION SURG PREP ANTIMICROBIAL 4 OZ SKIN WND EXIDINE

## (undated) DEVICE — NITINOL STONE RETRIEVAL BASKET: Brand: ZERO TIP

## (undated) DEVICE — FIBER LSR 365UM HOLM

## (undated) DEVICE — GOWN,AURORA,NON-REINFORCED,2XL: Brand: MEDLINE

## (undated) DEVICE — ADAPTER URO SCP UROLOK LL

## (undated) DEVICE — Z INACTIVE USE 2660664 SOLUTION IRRIG 3000ML 0.9% SOD CHL USP UROMATIC PLAS CONT

## (undated) DEVICE — SYRINGE MED 20ML STD CLR PLAS LUERLOCK TIP N CTRL DISP

## (undated) DEVICE — SINGLE ACTION PUMPING SYSTEM